# Patient Record
Sex: FEMALE | Race: WHITE | Employment: UNEMPLOYED | URBAN - METROPOLITAN AREA
[De-identification: names, ages, dates, MRNs, and addresses within clinical notes are randomized per-mention and may not be internally consistent; named-entity substitution may affect disease eponyms.]

---

## 2023-12-07 SDOH — HEALTH STABILITY: PHYSICAL HEALTH: ON AVERAGE, HOW MANY MINUTES DO YOU ENGAGE IN EXERCISE AT THIS LEVEL?: 20 MIN

## 2023-12-07 SDOH — ECONOMIC STABILITY: INCOME INSECURITY: HOW HARD IS IT FOR YOU TO PAY FOR THE VERY BASICS LIKE FOOD, HOUSING, MEDICAL CARE, AND HEATING?: NOT VERY HARD

## 2023-12-07 SDOH — ECONOMIC STABILITY: TRANSPORTATION INSECURITY
IN THE PAST 12 MONTHS, HAS LACK OF TRANSPORTATION KEPT YOU FROM MEETINGS, WORK, OR FROM GETTING THINGS NEEDED FOR DAILY LIVING?: NO

## 2023-12-07 SDOH — ECONOMIC STABILITY: FOOD INSECURITY: WITHIN THE PAST 12 MONTHS, THE FOOD YOU BOUGHT JUST DIDN'T LAST AND YOU DIDN'T HAVE MONEY TO GET MORE.: NEVER TRUE

## 2023-12-07 SDOH — ECONOMIC STABILITY: HOUSING INSECURITY
IN THE LAST 12 MONTHS, WAS THERE A TIME WHEN YOU DID NOT HAVE A STEADY PLACE TO SLEEP OR SLEPT IN A SHELTER (INCLUDING NOW)?: NO

## 2023-12-07 SDOH — ECONOMIC STABILITY: TRANSPORTATION INSECURITY
IN THE PAST 12 MONTHS, HAS THE LACK OF TRANSPORTATION KEPT YOU FROM MEDICAL APPOINTMENTS OR FROM GETTING MEDICATIONS?: NO

## 2023-12-07 SDOH — ECONOMIC STABILITY: INCOME INSECURITY: IN THE LAST 12 MONTHS, WAS THERE A TIME WHEN YOU WERE NOT ABLE TO PAY THE MORTGAGE OR RENT ON TIME?: NO

## 2023-12-07 SDOH — ECONOMIC STABILITY: FOOD INSECURITY: WITHIN THE PAST 12 MONTHS, YOU WORRIED THAT YOUR FOOD WOULD RUN OUT BEFORE YOU GOT MONEY TO BUY MORE.: NEVER TRUE

## 2023-12-07 SDOH — HEALTH STABILITY: PHYSICAL HEALTH: ON AVERAGE, HOW MANY DAYS PER WEEK DO YOU ENGAGE IN MODERATE TO STRENUOUS EXERCISE (LIKE A BRISK WALK)?: 2 DAYS

## 2023-12-07 SDOH — ECONOMIC STABILITY: HOUSING INSECURITY: IN THE LAST 12 MONTHS, HOW MANY PLACES HAVE YOU LIVED?: 1

## 2023-12-07 SDOH — ECONOMIC STABILITY: TRANSPORTATION INSECURITY
IN THE PAST 12 MONTHS, HAS LACK OF RELIABLE TRANSPORTATION KEPT YOU FROM MEDICAL APPOINTMENTS, MEETINGS, WORK OR FROM GETTING THINGS NEEDED FOR DAILY LIVING?: NO

## 2023-12-07 SDOH — HEALTH STABILITY: MENTAL HEALTH
STRESS IS WHEN SOMEONE FEELS TENSE, NERVOUS, ANXIOUS, OR CAN'T SLEEP AT NIGHT BECAUSE THEIR MIND IS TROUBLED. HOW STRESSED ARE YOU?: RATHER MUCH

## 2023-12-07 ASSESSMENT — LIFESTYLE VARIABLES
HOW OFTEN DO YOU HAVE SIX OR MORE DRINKS ON ONE OCCASION: NEVER
HOW MANY STANDARD DRINKS CONTAINING ALCOHOL DO YOU HAVE ON A TYPICAL DAY: 1 OR 2
HOW OFTEN DO YOU HAVE A DRINK CONTAINING ALCOHOL: MONTHLY OR LESS
HOW OFTEN DO YOU HAVE A DRINK CONTAINING ALCOHOL: MONTHLY OR LESS
AUDIT-C TOTAL SCORE: 1
HOW MANY STANDARD DRINKS CONTAINING ALCOHOL DO YOU HAVE ON A TYPICAL DAY: 1 OR 2
SKIP TO QUESTIONS 9-10: 1
SKIP TO QUESTIONS 9-10: 1
HOW OFTEN DO YOU HAVE SIX OR MORE DRINKS ON ONE OCCASION: NEVER
AUDIT-C TOTAL SCORE: 1

## 2023-12-07 ASSESSMENT — SOCIAL DETERMINANTS OF HEALTH (SDOH)
IN A TYPICAL WEEK, HOW MANY TIMES DO YOU TALK ON THE PHONE WITH FAMILY, FRIENDS, OR NEIGHBORS?: MORE THAN THREE TIMES A WEEK
ARE YOU MARRIED, WIDOWED, DIVORCED, SEPARATED, NEVER MARRIED, OR LIVING WITH A PARTNER?: NEVER MARRIED
HOW OFTEN DO YOU GET TOGETHER WITH FRIENDS OR RELATIVES?: ONCE A WEEK
ARE YOU MARRIED, WIDOWED, DIVORCED, SEPARATED, NEVER MARRIED, OR LIVING WITH A PARTNER?: NEVER MARRIED
HOW OFTEN DO YOU ATTENT MEETINGS OF THE CLUB OR ORGANIZATION YOU BELONG TO?: NEVER
HOW OFTEN DO YOU HAVE SIX OR MORE DRINKS ON ONE OCCASION: NEVER
HOW OFTEN DO YOU HAVE A DRINK CONTAINING ALCOHOL: MONTHLY OR LESS
ARE YOU MARRIED, WIDOWED, DIVORCED, SEPARATED, NEVER MARRIED, OR LIVING WITH A PARTNER?: NEVER MARRIED
WITHIN THE PAST 12 MONTHS, YOU WORRIED THAT YOUR FOOD WOULD RUN OUT BEFORE YOU GOT THE MONEY TO BUY MORE: NEVER TRUE
DO YOU BELONG TO ANY CLUBS OR ORGANIZATIONS SUCH AS CHURCH GROUPS UNIONS, FRATERNAL OR ATHLETIC GROUPS, OR SCHOOL GROUPS?: NO
HOW HARD IS IT FOR YOU TO PAY FOR THE VERY BASICS LIKE FOOD, HOUSING, MEDICAL CARE, AND HEATING?: NOT VERY HARD
HOW OFTEN DO YOU GET TOGETHER WITH FRIENDS OR RELATIVES?: ONCE A WEEK
HOW OFTEN DO YOU ATTEND CHURCH OR RELIGIOUS SERVICES?: NEVER
DO YOU BELONG TO ANY CLUBS OR ORGANIZATIONS SUCH AS CHURCH GROUPS UNIONS, FRATERNAL OR ATHLETIC GROUPS, OR SCHOOL GROUPS?: NO
IN A TYPICAL WEEK, HOW MANY TIMES DO YOU TALK ON THE PHONE WITH FAMILY, FRIENDS, OR NEIGHBORS?: MORE THAN THREE TIMES A WEEK
HOW OFTEN DO YOU ATTEND CHURCH OR RELIGIOUS SERVICES?: NEVER
HOW OFTEN DO YOU ATTEND CHURCH OR RELIGIOUS SERVICES?: NEVER
HOW MANY DRINKS CONTAINING ALCOHOL DO YOU HAVE ON A TYPICAL DAY WHEN YOU ARE DRINKING: 1 OR 2
DO YOU BELONG TO ANY CLUBS OR ORGANIZATIONS SUCH AS CHURCH GROUPS UNIONS, FRATERNAL OR ATHLETIC GROUPS, OR SCHOOL GROUPS?: NO
HOW OFTEN DO YOU GET TOGETHER WITH FRIENDS OR RELATIVES?: ONCE A WEEK
HOW OFTEN DO YOU ATTENT MEETINGS OF THE CLUB OR ORGANIZATION YOU BELONG TO?: NEVER
HOW OFTEN DO YOU ATTENT MEETINGS OF THE CLUB OR ORGANIZATION YOU BELONG TO?: NEVER
IN A TYPICAL WEEK, HOW MANY TIMES DO YOU TALK ON THE PHONE WITH FAMILY, FRIENDS, OR NEIGHBORS?: MORE THAN THREE TIMES A WEEK

## 2023-12-08 ENCOUNTER — APPOINTMENT (OUTPATIENT)
Dept: MEDICAL GROUP | Facility: IMAGING CENTER | Age: 63
End: 2023-12-08
Payer: MEDICARE

## 2023-12-08 SDOH — HEALTH STABILITY: PHYSICAL HEALTH: ON AVERAGE, HOW MANY DAYS PER WEEK DO YOU ENGAGE IN MODERATE TO STRENUOUS EXERCISE (LIKE A BRISK WALK)?: 2 DAYS

## 2023-12-08 SDOH — HEALTH STABILITY: PHYSICAL HEALTH: ON AVERAGE, HOW MANY MINUTES DO YOU ENGAGE IN EXERCISE AT THIS LEVEL?: 20 MIN

## 2023-12-08 SDOH — ECONOMIC STABILITY: HOUSING INSECURITY: IN THE LAST 12 MONTHS, HOW MANY PLACES HAVE YOU LIVED?: 1

## 2023-12-08 ASSESSMENT — SOCIAL DETERMINANTS OF HEALTH (SDOH)
HOW OFTEN DO YOU HAVE A DRINK CONTAINING ALCOHOL: MONTHLY OR LESS
HOW OFTEN DO YOU ATTEND CHURCH OR RELIGIOUS SERVICES?: NEVER
DO YOU BELONG TO ANY CLUBS OR ORGANIZATIONS SUCH AS CHURCH GROUPS UNIONS, FRATERNAL OR ATHLETIC GROUPS, OR SCHOOL GROUPS?: NO
IN A TYPICAL WEEK, HOW MANY TIMES DO YOU TALK ON THE PHONE WITH FAMILY, FRIENDS, OR NEIGHBORS?: MORE THAN THREE TIMES A WEEK
HOW HARD IS IT FOR YOU TO PAY FOR THE VERY BASICS LIKE FOOD, HOUSING, MEDICAL CARE, AND HEATING?: NOT VERY HARD
HOW OFTEN DO YOU GET TOGETHER WITH FRIENDS OR RELATIVES?: ONCE A WEEK
HOW OFTEN DO YOU HAVE SIX OR MORE DRINKS ON ONE OCCASION: NEVER
HOW OFTEN DO YOU ATTENT MEETINGS OF THE CLUB OR ORGANIZATION YOU BELONG TO?: NEVER
ARE YOU MARRIED, WIDOWED, DIVORCED, SEPARATED, NEVER MARRIED, OR LIVING WITH A PARTNER?: NEVER MARRIED
HOW MANY DRINKS CONTAINING ALCOHOL DO YOU HAVE ON A TYPICAL DAY WHEN YOU ARE DRINKING: 1 OR 2
WITHIN THE PAST 12 MONTHS, YOU WORRIED THAT YOUR FOOD WOULD RUN OUT BEFORE YOU GOT THE MONEY TO BUY MORE: NEVER TRUE

## 2023-12-12 ENCOUNTER — OFFICE VISIT (OUTPATIENT)
Dept: MEDICAL GROUP | Facility: IMAGING CENTER | Age: 63
End: 2023-12-12
Payer: MEDICARE

## 2023-12-12 VITALS
WEIGHT: 218.7 LBS | HEART RATE: 78 BPM | HEIGHT: 63 IN | SYSTOLIC BLOOD PRESSURE: 128 MMHG | BODY MASS INDEX: 38.75 KG/M2 | RESPIRATION RATE: 16 BRPM | OXYGEN SATURATION: 98 % | DIASTOLIC BLOOD PRESSURE: 76 MMHG | TEMPERATURE: 97.8 F

## 2023-12-12 DIAGNOSIS — Z51.81 ENCOUNTER FOR MEDICATION MONITORING: ICD-10-CM

## 2023-12-12 DIAGNOSIS — Z13.6 SCREENING FOR CARDIOVASCULAR CONDITION: ICD-10-CM

## 2023-12-12 DIAGNOSIS — Z11.4 SCREENING FOR HIV (HUMAN IMMUNODEFICIENCY VIRUS): ICD-10-CM

## 2023-12-12 DIAGNOSIS — Z91.89 AT RISK FOR SLEEP APNEA: ICD-10-CM

## 2023-12-12 DIAGNOSIS — J01.00 ACUTE NON-RECURRENT MAXILLARY SINUSITIS: ICD-10-CM

## 2023-12-12 DIAGNOSIS — Z11.59 NEED FOR HEPATITIS C SCREENING TEST: ICD-10-CM

## 2023-12-12 DIAGNOSIS — G25.2 FINE TREMOR: ICD-10-CM

## 2023-12-12 DIAGNOSIS — G89.29 CHRONIC RIGHT HIP PAIN: ICD-10-CM

## 2023-12-12 DIAGNOSIS — F33.0 MILD EPISODE OF RECURRENT MAJOR DEPRESSIVE DISORDER (HCC): ICD-10-CM

## 2023-12-12 DIAGNOSIS — Z12.31 ENCOUNTER FOR SCREENING MAMMOGRAM FOR BREAST CANCER: ICD-10-CM

## 2023-12-12 DIAGNOSIS — S52.501D CLOSED FRACTURE OF DISTAL END OF RIGHT RADIUS WITH ROUTINE HEALING, UNSPECIFIED FRACTURE MORPHOLOGY, SUBSEQUENT ENCOUNTER: ICD-10-CM

## 2023-12-12 DIAGNOSIS — Z01.419 ENCOUNTER FOR WELL WOMAN EXAM WITH ROUTINE GYNECOLOGICAL EXAM: ICD-10-CM

## 2023-12-12 DIAGNOSIS — M25.551 CHRONIC RIGHT HIP PAIN: ICD-10-CM

## 2023-12-12 DIAGNOSIS — R45.86 MOOD SWINGS: ICD-10-CM

## 2023-12-12 PROBLEM — F32.A DEPRESSION: Status: ACTIVE | Noted: 2023-12-12

## 2023-12-12 PROCEDURE — 99204 OFFICE O/P NEW MOD 45 MIN: CPT | Performed by: INTERNAL MEDICINE

## 2023-12-12 PROCEDURE — 3074F SYST BP LT 130 MM HG: CPT | Performed by: INTERNAL MEDICINE

## 2023-12-12 PROCEDURE — 3078F DIAST BP <80 MM HG: CPT | Performed by: INTERNAL MEDICINE

## 2023-12-12 PROCEDURE — 1126F AMNT PAIN NOTED NONE PRSNT: CPT | Performed by: INTERNAL MEDICINE

## 2023-12-12 RX ORDER — BUSPIRONE HYDROCHLORIDE 5 MG/1
5 TABLET ORAL DAILY
COMMUNITY
Start: 2023-11-13 | End: 2024-03-06 | Stop reason: SDUPTHER

## 2023-12-12 RX ORDER — DIVALPROEX SODIUM 250 MG
250 TABLET, EXTENDED RELEASE 24 HR ORAL DAILY
COMMUNITY
Start: 2021-12-13 | End: 2023-12-12

## 2023-12-12 RX ORDER — PROPRANOLOL HYDROCHLORIDE 10 MG/1
10-20 TABLET ORAL 2 TIMES DAILY
COMMUNITY
Start: 2021-12-03 | End: 2024-02-23

## 2023-12-12 RX ORDER — AMOXICILLIN AND CLAVULANATE POTASSIUM 875; 125 MG/1; MG/1
1 TABLET, FILM COATED ORAL 2 TIMES DAILY
Qty: 10 TABLET | Refills: 0 | Status: SHIPPED | OUTPATIENT
Start: 2023-12-12 | End: 2023-12-17

## 2023-12-12 RX ORDER — SERTRALINE HCL 25 MG
25 TABLET ORAL DAILY
COMMUNITY
Start: 2023-12-07 | End: 2024-02-26

## 2023-12-12 RX ORDER — DIVALPROEX SODIUM 250 MG
1500 TABLET, EXTENDED RELEASE 24 HR ORAL DAILY
Qty: 30 TABLET | COMMUNITY
Start: 2023-12-12

## 2023-12-12 RX ORDER — OMEGA-3 FATTY ACIDS/FISH OIL 300-1000MG
200 CAPSULE ORAL PRN
COMMUNITY
Start: 2021-12-13 | End: 2023-12-28

## 2023-12-12 ASSESSMENT — PAIN SCALES - GENERAL: PAINLEVEL: NO PAIN

## 2023-12-12 ASSESSMENT — PATIENT HEALTH QUESTIONNAIRE - PHQ9: CLINICAL INTERPRETATION OF PHQ2 SCORE: 0

## 2023-12-12 NOTE — LETTER
[facility]  Pcp Pt States None  No address on file  Fax: [phone]   Authorization for Release/Disclosure of   Protected Health Information   Name: SUZAN GARLAND : 1960 SSN: [id number]   Address: [facility] [place] [address] Phone:    There are no phone numbers on file.   I authorize the entity listed below to release/disclose the PHI below to:   [facility]/Pcp Pt States None and Abhinav Jones M.D.   Provider or Entity Name:     Address   City, State, [facility]   Phone:      Fax:     Reason for request: continuity of care   Information to be released:    [  ] LAST COLONOSCOPY,  including any PATH REPORT and follow-up  [  ] LAST FIT/COLOGUARD RESULT [  ] LAST DEXA  [  ] LAST MAMMOGRAM  [  ] LAST PAP  [  ] LAST LABS [  ] RETINA EXAM REPORT  [  ] IMMUNIZATION RECORDS  [  ] Release all info      [  ] Check here and initial the line next to each item to release ALL health information INCLUDING  _____ Care and treatment for drug and / or alcohol abuse  _____ HIV testing, infection status, or AIDS  _____ Genetic Testing    DATES OF SERVICE OR TIME PERIOD TO BE DISCLOSED: _____________  I understand and acknowledge that:  * This Authorization may be revoked at any time by you in writing, except if your health information has already been used or disclosed.  * Your health information that will be used or disclosed as a result of you signing this authorization could be re-disclosed by the recipient. If this occurs, your re-disclosed health information may no longer be protected by State or Federal laws.  * You may refuse to sign this Authorization. Your refusal will not affect your ability to obtain treatment.  * This Authorization becomes effective upon signing and will  on (date) __________.      If no date is indicated, this Authorization will  one (1) year from the signature date.    Name: Suzan Garland  Signature: Date:   2023     PLEASE FAX REQUESTED RECORDS BACK TO: [phone]  [phone]

## 2023-12-12 NOTE — ASSESSMENT & PLAN NOTE
7/7/2023 right wrist x ray: stable alignment of the distal radius fractures. No complicating features.    She had GLF and had right radius fractures in [place] in May 2023. She did occupational therapy.         Referral orthopedics

## 2023-12-12 NOTE — ASSESSMENT & PLAN NOTE
Patient reported she gets fine tremors taking Depakote, so she was put on propranolol to control the fine tremors  On propranolol 10-20 mg BID prnOn propranolol 10-20 mg BID prn

## 2023-12-12 NOTE — ASSESSMENT & PLAN NOTE
Snoring loud, daytime fatigue, non-refreshing sleep, wake up a few night   Body mass index is 38.74 kg/m².

## 2023-12-13 NOTE — ASSESSMENT & PLAN NOTE
Sinus congestion, runny nose, cough, yellow sputum since 12/5/2023    Denies fever, chest pain, SOB  Taking DayQuil and night Quil, steamy shower, drinking water, honey tea, lemon  She tested herself at home, Covid-19 negative

## 2023-12-13 NOTE — ASSESSMENT & PLAN NOTE
On depakote ER 1500 mg daily per her previous psychiatrist for bipolar like symptoms.   She would like to see psychiatry

## 2023-12-26 ENCOUNTER — PHYSICAL THERAPY (OUTPATIENT)
Dept: PHYSICAL THERAPY | Facility: REHABILITATION | Age: 63
End: 2023-12-26
Attending: INTERNAL MEDICINE
Payer: MEDICARE

## 2023-12-26 DIAGNOSIS — M25.539 PAIN IN WRIST, UNSPECIFIED LATERALITY: ICD-10-CM

## 2023-12-26 DIAGNOSIS — M79.643 PAIN OF HAND, UNSPECIFIED LATERALITY: ICD-10-CM

## 2023-12-26 DIAGNOSIS — S52.501D CLOSED FRACTURE OF DISTAL END OF RIGHT RADIUS WITH ROUTINE HEALING, UNSPECIFIED FRACTURE MORPHOLOGY, SUBSEQUENT ENCOUNTER: ICD-10-CM

## 2023-12-26 PROCEDURE — 97162 PT EVAL MOD COMPLEX 30 MIN: CPT

## 2023-12-26 ASSESSMENT — ENCOUNTER SYMPTOMS
PAIN SCALE AT HIGHEST: 5
PAIN SCALE: 5
PAIN SCALE AT LOWEST: 3

## 2023-12-26 NOTE — OP THERAPY EVALUATION
Outpatient Physical Therapy  INITIAL EVALUATION    [facility] [address].  [address]  [place] [address]  Phone:  [phone]  Fax:  [phone]    Date of Evaluation: 2023    Patient: Iveth Daley  YOB: 1960  MRN: [id number]     Referring Provider: Abhinav Jones M.D.  [facility] [place] [address]   Referring Diagnosis Closed fracture of distal end of right radius with routine healing, unspecified fracture morphology, subsequent encounter [S52.501D];Chronic right hip pain [M25.551, G89.29]     Time Calculation                 Chief Complaint: No chief complaint on file.    Visit Diagnoses     ICD-10-CM   1. Closed fracture of distal end of right radius with routine healing, unspecified fracture morphology, subsequent encounter  S52.501D       Date of onset of impairment: No data found    Subjective:   History of Present Illness:     Mechanism of injury:  Pt. is a 63 Y.O.F who reports a FOOSH while chasing after her nephew resulting in R wrist fracture in May. She was casted and went home the next day ( she was in [place] visiting relatives and lives in [place].  She was placed n a new cast x 6 weeks then after that she did occupation therapy and worked on ROM, gripping and dexterity exercises. She relocated to [place] and is establishing care here and wants to continue with rehabilitation for the R limb.  PMHx: significant for carpal tunnel, tendon release >20 years; R shoulder surgery- debridment     Pain:     Current pain ratin    At best pain rating:  3    At worst pain ratin    Pain Comments::  PAIN  Location: 1. R wrist anterolat., refers to: FA -lateral aspect  2. 'flexor trigger'  Descriptors: 1. Stiff, a.m.  2.   Aggravated with arm extension as with cleaning Wt. bearing, groceries, kitchen tasks -peel  settles with hand exercises, lasting after settles.  Eases w/: rock tape, ice, Tylenol  Progression: better, but exacerbation  d/t recent move  Hand dominance:  Right  Patient Goals:     Patient goals for therapy:  Decreased pain and increased strength      Past Medical History:   Diagnosis Date    Anxiety     Depression     Diverticulitis of ascending colon 2021    abscess in the colon    Wrist fracture 05/10/2023    Right wrist broken from a fall     Past Surgical History:   Procedure Laterality Date    COLON RESECTION       Social History     Tobacco Use    Smoking status: Never     Passive exposure: Yes    Smokeless tobacco: Never   Substance Use Topics    Alcohol use: Yes     Alcohol/week: 0.6 oz     Types: 1 Glasses of wine per week     Comment: On occasion wine with dinner     Family and Occupational History     Socioeconomic History    Marital status: Single     Spouse name: Not on file    Number of children: Not on file    Years of education: Not on file    Highest education level: Associate degree: academic program   Occupational History    Not on file       Objective     Active Range of Motion     Right Shoulder   Flexion: 170 degrees     Left Elbow   Normal active range of motion    Right Elbow   Extension: -5 degrees   Flexion: WFL    Right Wrist   Wrist flexion: 45 degrees   Wrist extension: 75 degrees     Strength:      Left Shoulder   Normal muscle strength    Right Shoulder   Planes of Motion   Flexion: 4   Extension: 5   Abduction: 4-   Adduction: 5   External rotation at 0°: 4-   Internal rotation at 0°: 5   Isolated Muscles   Biceps: 5   Triceps: 4+     Additional Strength Details  Dynamometer ( TI elbow F)   L: 35#   R: 15#      Exercises/Treatment  Time-based treatments/modalities:           Assessment, Response and Plan:   Assessment details:  Pt. Is a 63 y.o. F who presents with above impairments and S: moderate I: mild N: R radial Fx S: chronic. Pt. Is appropriate for skilled PT intervention. Pt. was educated in anatomical, pathoanatomical considerations of PT diagosis and treatment options to address it.   Pt. Also educated in compliance with PT visits/HEP to improve functional limitations and reach goals.     Barriers to therapy:  Age  Prognosis: good    Goals:   Short Term Goals:   Pt will teach back a HEP decreasing pain, improving ROM and strength    Inc. Elbow arom 0-135    Short term goal time span:  2-4 weeks      Long Term Goals:    Inc. Gross Strength of shoulder by 1 mmt grade to 4+/5/5   Pt able to sit> stand w/UE support w/out difficulty/pain   Able to lift, push, pull 10# for ability to open car, front door, lift groceries    Long term goal time span:  4-6 weeks    Plan:   Therapy options:  Physical therapy treatment to continue  Planned therapy interventions:  Self Care ADL Training (CPT 48794), E Stim Unattended (CPT 79138), Ultrasound (CPT 99006), Manual Therapy (CPT 58126), Neuromuscular Re-education (CPT 49881) and Therapeutic Exercise (CPT 78167)  Frequency:  2x week  Duration in visits:  10  Discussed with:  Patient  Plan details:  R hip evaluation  Recommend OT evaluation      Functional Assessment Used        Referring provider co-signature:  I have reviewed this plan of care and my co-signature certifies the need for services.    Certification Period: 12/26/2023 to  02/08/24    Physician Signature: ________________________________ Date: ______________

## 2023-12-26 NOTE — PROGRESS NOTES
Pain in wrist, unspecified laterality  -     Referral to Occupational Therapy    Pain of hand, unspecified laterality  -     Referral to Occupational Therapy

## 2023-12-28 ENCOUNTER — OFFICE VISIT (OUTPATIENT)
Dept: MEDICAL GROUP | Facility: IMAGING CENTER | Age: 63
End: 2023-12-28
Payer: MEDICARE

## 2023-12-28 ENCOUNTER — PHYSICAL THERAPY (OUTPATIENT)
Dept: PHYSICAL THERAPY | Facility: REHABILITATION | Age: 63
End: 2023-12-28
Attending: INTERNAL MEDICINE
Payer: MEDICARE

## 2023-12-28 VITALS
RESPIRATION RATE: 16 BRPM | BODY MASS INDEX: 38.02 KG/M2 | HEART RATE: 66 BPM | HEIGHT: 63 IN | TEMPERATURE: 98.3 F | OXYGEN SATURATION: 96 % | WEIGHT: 214.6 LBS | SYSTOLIC BLOOD PRESSURE: 130 MMHG | DIASTOLIC BLOOD PRESSURE: 80 MMHG

## 2023-12-28 DIAGNOSIS — R14.0 ABDOMINAL BLOATING: ICD-10-CM

## 2023-12-28 DIAGNOSIS — S52.521D CLOSED TORUS FRACTURE OF DISTAL END OF RIGHT RADIUS WITH ROUTINE HEALING, SUBSEQUENT ENCOUNTER: ICD-10-CM

## 2023-12-28 PROBLEM — K31.9 STOMACH PROBLEMS: Status: ACTIVE | Noted: 2023-12-28

## 2023-12-28 LAB
APPEARANCE UR: CLEAR
BILIRUB UR STRIP-MCNC: NEGATIVE MG/DL
COLOR UR AUTO: YELLOW
GLUCOSE UR STRIP.AUTO-MCNC: NEGATIVE MG/DL
KETONES UR STRIP.AUTO-MCNC: NEGATIVE MG/DL
LEUKOCYTE ESTERASE UR QL STRIP.AUTO: NEGATIVE
NITRITE UR QL STRIP.AUTO: NEGATIVE
PH UR STRIP.AUTO: 7.5 [PH] (ref 5–8)
PROT UR QL STRIP: NEGATIVE MG/DL
RBC UR QL AUTO: NEGATIVE
SP GR UR STRIP.AUTO: 1.01
UROBILINOGEN UR STRIP-MCNC: 0.2 MG/DL

## 2023-12-28 PROCEDURE — 3075F SYST BP GE 130 - 139MM HG: CPT | Performed by: PHYSICIAN ASSISTANT

## 2023-12-28 PROCEDURE — 99213 OFFICE O/P EST LOW 20 MIN: CPT | Performed by: PHYSICIAN ASSISTANT

## 2023-12-28 PROCEDURE — 97110 THERAPEUTIC EXERCISES: CPT

## 2023-12-28 PROCEDURE — 97535 SELF CARE MNGMENT TRAINING: CPT

## 2023-12-28 PROCEDURE — 81002 URINALYSIS NONAUTO W/O SCOPE: CPT | Performed by: PHYSICIAN ASSISTANT

## 2023-12-28 PROCEDURE — 3079F DIAST BP 80-89 MM HG: CPT | Performed by: PHYSICIAN ASSISTANT

## 2023-12-28 PROCEDURE — 1126F AMNT PAIN NOTED NONE PRSNT: CPT | Performed by: PHYSICIAN ASSISTANT

## 2023-12-28 RX ORDER — FAMOTIDINE 20 MG/1
20 TABLET, FILM COATED ORAL 2 TIMES DAILY
Qty: 10 TABLET | Refills: 0 | Status: SHIPPED | OUTPATIENT
Start: 2023-12-28 | End: 2024-01-04 | Stop reason: SDUPTHER

## 2023-12-28 RX ORDER — OMEPRAZOLE 40 MG/1
40 CAPSULE, DELAYED RELEASE ORAL DAILY
Qty: 30 CAPSULE | Refills: 0 | Status: SHIPPED | OUTPATIENT
Start: 2023-12-28 | End: 2024-01-22

## 2023-12-28 ASSESSMENT — PAIN SCALES - GENERAL: PAINLEVEL: NO PAIN

## 2023-12-28 NOTE — OP THERAPY DAILY TREATMENT
"Outpatient Physical Therapy  DAILY TREATMENT     [facility] [address].  [address]  [place] [address]  Phone:  [phone]  Fax:  [phone]    Date: 12/28/2023    Patient: Iveth Daley  YOB: 1960  MRN: [id number]     Time Calculation    Start time: 1030  Stop time: 1115 Time Calculation (min): 45 minutes         Chief Complaint: No chief complaint on file.    Visit #: 2    SUBJECTIVE:  'same.' I am doing wrist stretches. I used to do all these exercises in OT  Location: 1. R wrist anterolat., refers to: FA -lateral aspect  2. 'flexor trigger'  Descriptors: 1. Stiff, a.m.  2.   Aggravated with arm extension as with cleaning Wt. bearing, groceries, kitchen tasks -peel  settles with hand exercises    OBJECTIVE:  Current objective measures:   Isolated Muscles   Biceps: 5   Triceps: 4+ Right Elbow   WEExtension: -5 degrees   Flexion: WFL    Right Wrist   Wrist flexion: 45 degrees   Wrist extension: 75 degrees   Flexion: 4   Extension: 5   Abduction: 4-   Adduction: 5   External rotation at 0°: 4-   Internal rotation at 0°: 5           Therapeutic Exercises (CPT 16328):     1. FA: WE, Supination, 10 PITB    2. Elbow extension, 10 PiTB; QS    3. FTT wall/scapula retract, 10x5\"    4. supine R SH F, 10 2#    5. S/L R SH ER, 10-12 1#    6. S/L R SH ABD, 10 2#    7. sit>stand w/RUE wb, x5    8. wall PU      Therapeutic Exercise Summary: GERIATRIC CONDITIONING  [RFYN]    ELASTIC BAND WRIST EXTENSION  -  Repeat 10 Times, Hold 1 Second(s), Complete 1 Set, Perform 1 Times a Day    ELASTIC BAND WRIST SUPINATION -  Repeat 12 Times, Hold 1 Second(s), Complete 1 Set, Perform 1 Times a Week    GERIATRIC - ELASTIC BAND TRICEPS EXTENSION -  Repeat 1 Time, Hold 1 Second(s), Complete 1 Set, Perform 1 Times a Day    Partial Sit to Stand for Unilateral UE Weight Bearing -  Repeat 5 Times, Perform 1 Times a Day      Time-based treatments/modalities:           Pain rating (1-10) before " treatment:  5  Pain rating (1-10) after treatment:  3    ASSESSMENT:   Response to treatment: toleates the PREs well. Returns demo of wrist ex to carryout w/hout.  p/w N: R radial Fx S: chronic    PLAN/RECOMMENDATIONS: Pt. To have an OT consult  Plan for treatment: therapy treatment to continue next visit.  Planned interventions for next visit: continue with current treatment.

## 2023-12-29 ENCOUNTER — HOSPITAL ENCOUNTER (OUTPATIENT)
Dept: RADIOLOGY | Facility: MEDICAL CENTER | Age: 63
End: 2023-12-29
Attending: PHYSICIAN ASSISTANT
Payer: MEDICARE

## 2023-12-29 DIAGNOSIS — R14.0 ABDOMINAL BLOATING: ICD-10-CM

## 2023-12-29 PROCEDURE — 74018 RADEX ABDOMEN 1 VIEW: CPT

## 2023-12-29 NOTE — PROGRESS NOTES
Subjective:     CC:   Chief Complaint   Patient presents with    GI Problem     Pt states she has been feeling gassy, bloating, Hx of diverticulitis. Tried Gas-x, miralax. Some loose stools     Other     She was amoxicillin for sinus infection and doesn't know is this correlates.        HPI:   Iveth presents today to discuss:    Stomach problems  Pt admits to upper abdominal pressure and bloating x 2 weeks. Has had increased gas. Has been following a clear liquid diet with some mild relief. Gasx and miralax have helped mildly. Had been eating fattier foods lately due to the holidays.  Takes daily ibuprofen for arthritis.  Has a history of cholecystectomy. No N/V/Gerd. Was recently on amoxicillin for a sinus infection - still having post nasal drip with green phlegm. Has a history of diverticulitis with partial colectomy (2021).  No melena or hematochezia.      Past Medical History:   Diagnosis Date    Anxiety     Depression     Diverticulitis of ascending colon 2021    abscess in the colon    Wrist fracture 05/10/2023    Right wrist broken from a fall     Family History   Problem Relation Age of Onset    Stroke Mother     Parkinson's Disease Father     Cancer Sister         MM    No Known Problems Brother     Ovarian Cancer Neg Hx     Tubal Cancer Neg Hx     Peritoneal Cancer Neg Hx     Colorectal Cancer Neg Hx     Breast Cancer Neg Hx     Bilateral Breast Cancer Neg Hx     BRCA 1 Neg Hx     BRCA 1 Carrier  Neg Hx     BRCA 2 Neg Hx     BRCA 2 Carrier Neg Hx      Past Surgical History:   Procedure Laterality Date    CHOLECYSTECTOMY      2021    COLON RESECTION       Social History     Tobacco Use    Smoking status: Never     Passive exposure: Yes    Smokeless tobacco: Never   Vaping Use    Vaping Use: Some days    Substances: THC   Substance Use Topics    Alcohol use: Yes     Alcohol/week: 0.6 oz     Types: 1 Glasses of wine per week     Comment: On occasion wine with dinner    Drug use: Not Currently      "Frequency: 2.0 times per week     Types: Marijuana     Social History     Social History Narrative    Not on file     Current Outpatient Medications Ordered in Epic   Medication Sig Dispense Refill    omeprazole (PRILOSEC) 40 MG delayed-release capsule Take 1 Capsule by mouth every day. 30 Capsule 0    famotidine (PEPCID) 20 MG Tab Take 1 Tablet by mouth 2 times a day. 10 Tablet 0    busPIRone (BUSPAR) 5 MG tablet Take 5 mg by mouth every day.      propranolol (INDERAL) 10 MG Tab Take 10-20 mg by mouth 2 times a day.      ZOLOFT 25 MG tablet Take 25 mg by mouth every day. Half a tab in the morning      DEPAKOTE  MG TABLET SR 24 HR Take 6 Tablets by mouth every day. 30 Tablet      No current Epic-ordered facility-administered medications on file.     Codeine and Sulfa drugs    PMH/PSH/FH/Social history reviewed.  Vaccinations discussed.  Previous records and labs reviewed. Discussed age appropriate anticipatory guidance.    ROS: see hpi  Gen: no fevers/chills  Pulm: no sob, no cough  CV: no chest pain, no palpitations, no edema  GI: no nausea/vomiting, no diarrhea  Skin: no rash    Objective:   Exam:  /80 (BP Location: Left arm, Patient Position: Sitting, BP Cuff Size: Adult)   Pulse 66   Temp 36.8 °C (98.3 °F) (Temporal)   Resp 16   Ht 1.6 m (5' 3\")   Wt 97.3 kg (214 lb 9.6 oz)   SpO2 96%   BMI 38.01 kg/m²    Body mass index is 38.01 kg/m².    Gen: Alert and oriented, No apparent distress.  HEENT: Head atraumatic, normocephalic. Pupils equal and round.  Lungs: Normal effort, CTA bilaterally, no wheezes, rhonchi, or rales  CV: Regular rate and rhythm. No murmurs, rubs, or gallops.  ABD: +BS. Non-tender, non-distended. No rebound, rigidity, or guarding.  Ext: No clubbing, cyanosis, edema.    Assessment & Plan:     63 y.o. female with the following -     1. Abdominal bloating  Exam benign.  Suspect gastritis from chronic NSAID use.  Will start omeprazole and Pepcid.  After 5 days can stop Pepcid and " maintain omeprazole.  GERD diet.  May advance to soft foods and then solid foods as tolerated.  Stop NSAID.  May switch to Tylenol for pain.  Abdominal x-ray to assess bowel gas pattern.  UA negative.  Complete labs as ordered by PCP.  Follow-up in 1 week for recheck.  ER if symptoms worsen or if you develop fever, abdominal pain, diarrhea, melena, nausea/vomiting.  - POCT Urinalysis  - omeprazole (PRILOSEC) 40 MG delayed-release capsule; Take 1 Capsule by mouth every day.  Dispense: 30 Capsule; Refill: 0  - famotidine (PEPCID) 20 MG Tab; Take 1 Tablet by mouth 2 times a day.  Dispense: 10 Tablet; Refill: 0  - [id number]-0 VIEW; Future    Return in about 1 week (around 1/4/2024) for Follow-up labs/tests.    Val Hong PA-C ([place])  Physician Assistant Certified  [facility]    Please note that this dictation was created using voice recognition software. I have made every reasonable attempt to correct obvious errors, but I expect that there are errors of grammar and possibly content that I did not discover before finalizing the note.

## 2023-12-29 NOTE — ASSESSMENT & PLAN NOTE
Pt admits to upper abdominal pressure and bloating x 2 weeks. Has had increased gas. Has been following a clear liquid diet with some mild relief. Gasx and miralax have helped mildly. Had been eating fattier foods lately due to the holidays.  Takes daily ibuprofen for arthritis.  Has a history of cholecystectomy. No N/V/Gerd. Was recently on amoxicillin for a sinus infection - still having post nasal drip with green phlegm. Has a history of diverticulitis with partial colectomy (2021).  No melena or hematochezia.

## 2023-12-29 NOTE — PATIENT INSTRUCTIONS
It was a pleasure meeting with you today at [facility]!    Your medical history/records and medications were reviewed today.     UPDATE on MyChart Results: If you have blood work, and/or imaging studies, or any other test or procedure completed, you will have access to results as soon as they become available in MyChart. Recently, these results will be available for review at the same time that your provider is able to see results!    This will likely mean you will see a result before your provider has had a chance to review and discuss with you.  Some results or care notes may be hard to understand and may be serious in nature.    We look at every result and your provider will contact you to explain what they mean and discuss appropriate next steps. Please allow for at least 72 business hours for chart and result review.     We prefer that you wait for your care team to contact you with your results.  Often, your provider will discuss your results with you at your next appointment. We look forward to continuing to partner with you in your care.    Please review my practice information below:    If you have any prescription refill requests, please send them via [employer] or discuss with your provider at the start of your office visits. Please allow 3-5 business days for lab and testing review and you will be contacted via [employer] with those results, or if advised to make a follow up appointment regarding those results, then please do so.     Once resulted, your lab/test/imaging results will show up automatically in your MyChart. Please wait for my interpretation and recommendations prior to viewing your results to avoid any unnecessary confusion or misinterpretation. I will address all of the lab values that I interpret as abnormal and message you accordingly on your MyChart. I will always send you a message about your results even if they are normal. If you do not hear back from me within 5-7 business  days after completing your tests, then please send me a message on BRAINDIGIT so I can obtain your results (especially if you went to an outside lab or imaging center - [facility], [facility], etc).     If you have any additional questions or concerns beyond my interpretation of your results, please make an appointment with me to discuss in further detail.    Please only use the BRAIND[employer]IT messaging system for questions regarding your most recent appointment or if advised to use otherwise (glucose or blood pressure reporting).     If you have any new problems or concerns, you must make an appointment to discuss. This includes any referral requests, lab requests (unless advised to notify me for pre-appt labs), medication side effects, or request for medication adjustments.     Please arrive 15 minutes prior to your appointment time to complete your check-in and intake with the medical assistant.      Thank you,    Val Hong PA-C ([place])  Physician Assistant Certified  [facility]    -----------------------------------------------------------------    Attn: Patients of [facility]:    In an effort to continue to provide excellent and efficient care to our patients, it is vital that we continue to use our resources appropriately. With that, this is a reminder that B[employer]IT is used for prescription refill requests, test results, virtual visits, and chart review only.     Any new questions, concerns/conditions, lab/imaging requests, medication adjustments, new prescriptions, or referral requests do require an appointment (virtually or in person), unless discussed otherwise at your most recent appointment.     Thank you for your understanding,    [facility]

## 2024-01-03 ENCOUNTER — APPOINTMENT (OUTPATIENT)
Dept: PHYSICAL THERAPY | Facility: REHABILITATION | Age: 64
End: 2024-01-03
Attending: INTERNAL MEDICINE
Payer: MEDICARE

## 2024-01-04 ENCOUNTER — OFFICE VISIT (OUTPATIENT)
Dept: MEDICAL GROUP | Facility: IMAGING CENTER | Age: 64
End: 2024-01-04
Payer: MEDICARE

## 2024-01-04 VITALS
BODY MASS INDEX: 37.56 KG/M2 | TEMPERATURE: 97.2 F | WEIGHT: 212 LBS | OXYGEN SATURATION: 95 % | HEART RATE: 75 BPM | SYSTOLIC BLOOD PRESSURE: 128 MMHG | HEIGHT: 63 IN | DIASTOLIC BLOOD PRESSURE: 82 MMHG

## 2024-01-04 DIAGNOSIS — Z23 NEED FOR VACCINATION: ICD-10-CM

## 2024-01-04 DIAGNOSIS — R14.0 ABDOMINAL BLOATING: ICD-10-CM

## 2024-01-04 PROCEDURE — 3079F DIAST BP 80-89 MM HG: CPT | Performed by: PHYSICIAN ASSISTANT

## 2024-01-04 PROCEDURE — G0009 ADMIN PNEUMOCOCCAL VACCINE: HCPCS | Performed by: PHYSICIAN ASSISTANT

## 2024-01-04 PROCEDURE — 90677 PCV20 VACCINE IM: CPT | Performed by: PHYSICIAN ASSISTANT

## 2024-01-04 PROCEDURE — 3074F SYST BP LT 130 MM HG: CPT | Performed by: PHYSICIAN ASSISTANT

## 2024-01-04 PROCEDURE — 99213 OFFICE O/P EST LOW 20 MIN: CPT | Mod: 25 | Performed by: PHYSICIAN ASSISTANT

## 2024-01-04 RX ORDER — FAMOTIDINE 20 MG/1
20 TABLET, FILM COATED ORAL 2 TIMES DAILY
Qty: 60 TABLET | Refills: 0 | Status: SHIPPED | OUTPATIENT
Start: 2024-01-04 | End: 2024-01-16 | Stop reason: SDUPTHER

## 2024-01-04 RX ORDER — POLYETHYLENE GLYCOL 3350 17 G/17G
17 POWDER, FOR SOLUTION ORAL DAILY
COMMUNITY

## 2024-01-04 ASSESSMENT — PATIENT HEALTH QUESTIONNAIRE - PHQ9: CLINICAL INTERPRETATION OF PHQ2 SCORE: 0

## 2024-01-04 NOTE — PROGRESS NOTES
Subjective:     CC:   Chief Complaint   Patient presents with    Lab Results     Imaging        HPI:   Iveth presents today to discuss:    Abdominal bloating  Patient notes some improvement in her abdominal bloating and gas with the addition of omeprazole and Pepcid.  She completed a 5-day course of Pepcid.  She wishes to continue that medication, as she noticed the most improvement while taking it.  She has been trying to modify her diet to focus more on whole foods.  She drinks water and tea throughout the day.  Limited carbonated beverages.  Recently started on meloxicam for chronic hip pain.  No nausea, vomiting, diarrhea, constipation.  No abdominal pain.  Last colonoscopy within 5 years.  Has not established with a GI in [place] yet.      Past Medical History:   Diagnosis Date    Anxiety     Depression     Diverticulitis of ascending colon 2021    abscess in the colon    Wrist fracture 05/10/2023    Right wrist broken from a fall     Family History   Problem Relation Age of Onset    Stroke Mother     Parkinson's Disease Father     Cancer Sister         MM    No Known Problems Brother     Ovarian Cancer Neg Hx     Tubal Cancer Neg Hx     Peritoneal Cancer Neg Hx     Colorectal Cancer Neg Hx     Breast Cancer Neg Hx     Bilateral Breast Cancer Neg Hx     BRCA 1 Neg Hx     BRCA 1 Carrier  Neg Hx     BRCA 2 Neg Hx     BRCA 2 Carrier Neg Hx      Past Surgical History:   Procedure Laterality Date    CHOLECYSTECTOMY      2021    COLON RESECTION       Social History     Tobacco Use    Smoking status: Never     Passive exposure: Yes    Smokeless tobacco: Never   Vaping Use    Vaping Use: Some days    Substances: THC   Substance Use Topics    Alcohol use: Yes     Alcohol/week: 0.6 oz     Types: 1 Glasses of wine per week     Comment: On occasion wine with dinner    Drug use: Not Currently     Frequency: 2.0 times per week     Types: Marijuana     Social History     Social History Narrative    Not on file     Current  "Outpatient Medications Ordered in Epic   Medication Sig Dispense Refill    polyethylene glycol/lytes (MIRALAX) Pack Take 17 g by mouth every day.      Wheat Dextrin (BENEFIBER PO) Take  by mouth.      famotidine (PEPCID) 20 MG Tab Take 1 Tablet by mouth 2 times a day for 30 days. 60 Tablet 0    acetaminophen (TYLENOL) 325 MG Tab 325 mg.      meloxicam (MOBIC) 7.5 MG Tab Take 1 Tablet by mouth every day. 30 Tablet 1    omeprazole (PRILOSEC) 40 MG delayed-release capsule Take 1 Capsule by mouth every day. 30 Capsule 0    busPIRone (BUSPAR) 5 MG tablet Take 5 mg by mouth every day.      propranolol (INDERAL) 10 MG Tab Take 10-20 mg by mouth 2 times a day.      ZOLOFT 25 MG tablet Take 25 mg by mouth every day. Half a tab in the morning      DEPAKOTE  MG TABLET SR 24 HR Take 6 Tablets by mouth every day. 30 Tablet      No current Epic-ordered facility-administered medications on file.     Codeine and Sulfa drugs    PMH/PSH/FH/Social history reviewed.  Vaccinations discussed.  Previous records and labs reviewed. Discussed age appropriate anticipatory guidance.    ROS: see hpi  Gen: no fevers/chills  Pulm: no sob, no cough  CV: no chest pain, no palpitations, no edema  GI: no nausea/vomiting, no diarrhea  Skin: no rash    Objective:   Exam:  /82 (BP Location: Right arm, Patient Position: Sitting, BP Cuff Size: Adult long)   Pulse 75   Temp 36.2 °C (97.2 °F) (Temporal)   Ht 1.6 m (5' 3\")   Wt 96.2 kg (212 lb)   SpO2 95%   BMI 37.55 kg/m²    Body mass index is 37.55 kg/m².    Gen: Alert and oriented, No apparent distress.  HEENT: Head atraumatic, normocephalic. Pupils equal and round.  Lungs: Normal effort, CTA bilaterally, no wheezes, rhonchi, or rales  CV: Regular rate and rhythm. No murmurs, rubs, or gallops.  ABD: +BS. Non-tender, non-distended. No rebound, rigidity, or guarding.  Ext: No clubbing, cyanosis, edema.    Assessment & Plan:     63 y.o. female with the following -     1. Abdominal " bloating  Exam benign.  Will resume famotidine with omeprazole.  Reassess in 1 month.  Add MiraLAX and Benefiber daily for regularity.  Establish with GI.  Further testing/imaging per their (or PCP) discretion.  - Referral to Gastroenterology  - famotidine (PEPCID) 20 MG Tab; Take 1 Tablet by mouth 2 times a day for 30 days.  Dispense: 60 Tablet; Refill: 0    2. Need for vaccination  - Pneumococcal Conjugate Vaccine 20-Valent (6 wks+)    Return in about 1 month (around 2/4/2024) for Follow-up, Medication recheck with Dr. Jones.    Val Hong PA-C ([place])  Physician Assistant Certified  [facility]    Please note that this dictation was created using voice recognition software. I have made every reasonable attempt to correct obvious errors, but I expect that there are errors of grammar and possibly content that I did not discover before finalizing the note.

## 2024-01-04 NOTE — ASSESSMENT & PLAN NOTE
Patient notes some improvement in her abdominal bloating and gas with the addition of omeprazole and Pepcid.  She completed a 5-day course of Pepcid.  She wishes to continue that medication, as she noticed the most improvement while taking it.  She has been trying to modify her diet to focus more on whole foods.  She drinks water and tea throughout the day.  Limited carbonated beverages.  Recently started on meloxicam for chronic hip pain.  No nausea, vomiting, diarrhea, constipation.  No abdominal pain.  Last colonoscopy within 5 years.  Has not established with a GI in [place] yet.

## 2024-01-04 NOTE — PATIENT INSTRUCTIONS
It was a pleasure meeting with you today at [facility]!    Your medical history/records and medications were reviewed today.     UPDATE on MyChart Results: If you have blood work, and/or imaging studies, or any other test or procedure completed, you will have access to results as soon as they become available in MyChart. Recently, these results will be available for review at the same time that your provider is able to see results!    This will likely mean you will see a result before your provider has had a chance to review and discuss with you.  Some results or care notes may be hard to understand and may be serious in nature.    We look at every result and your provider will contact you to explain what they mean and discuss appropriate next steps. Please allow for at least 72 business hours for chart and result review.     We prefer that you wait for your care team to contact you with your results.  Often, your provider will discuss your results with you at your next appointment. We look forward to continuing to partner with you in your care.    Please review my practice information below:    If you have any prescription refill requests, please send them via [employer] or discuss with your provider at the start of your office visits. Please allow 3-5 business days for lab and testing review and you will be contacted via [employer] with those results, or if advised to make a follow up appointment regarding those results, then please do so.     Once resulted, your lab/test/imaging results will show up automatically in your MyChart. Please wait for my interpretation and recommendations prior to viewing your results to avoid any unnecessary confusion or misinterpretation. I will address all of the lab values that I interpret as abnormal and message you accordingly on your MyChart. I will always send you a message about your results even if they are normal. If you do not hear back from me within 5-7 business  days after completing your tests, then please send me a message on [employer] so I can obtain your results (especially if you went to an outside lab or imaging center - [facility], [facility], etc).     If you have any additional questions or concerns beyond my interpretation of your results, please make an appointment with me to discuss in further detail.    Please only use the [employer]File messaging system for questions regarding your most recent appointment or if advised to use otherwise (glucose or blood pressure reporting).     If you have any new problems or concerns, you must make an appointment to discuss. This includes any referral requests, lab requests (unless advised to notify me for pre-appt labs), medication side effects, or request for medication adjustments.     Please arrive 15 minutes prior to your appointment time to complete your check-in and intake with the medical assistant.      Thank you,    Val Hong PA-C ([place])  Physician Assistant Certified  [facility]    -----------------------------------------------------------------    Attn: Patients of [facility]:    In an effort to continue to provide excellent and efficient care to our patients, it is vital that we continue to use our resources appropriately. With that, this is a reminder that [employer]File is used for prescription refill requests, test results, virtual visits, and chart review only.     Any new questions, concerns/conditions, lab/imaging requests, medication adjustments, new prescriptions, or referral requests do require an appointment (virtually or in person), unless discussed otherwise at your most recent appointment.     Thank you for your understanding,    [facility]

## 2024-01-16 ENCOUNTER — OFFICE VISIT (OUTPATIENT)
Dept: MEDICAL GROUP | Facility: IMAGING CENTER | Age: 64
End: 2024-01-16
Payer: MEDICARE

## 2024-01-16 ENCOUNTER — HOSPITAL ENCOUNTER (OUTPATIENT)
Dept: RADIOLOGY | Facility: MEDICAL CENTER | Age: 64
End: 2024-01-16
Attending: INTERNAL MEDICINE
Payer: MEDICARE

## 2024-01-16 VITALS
HEIGHT: 63 IN | SYSTOLIC BLOOD PRESSURE: 120 MMHG | DIASTOLIC BLOOD PRESSURE: 70 MMHG | HEART RATE: 81 BPM | OXYGEN SATURATION: 98 % | RESPIRATION RATE: 14 BRPM | BODY MASS INDEX: 37.95 KG/M2 | WEIGHT: 214.2 LBS | TEMPERATURE: 97.8 F

## 2024-01-16 DIAGNOSIS — K21.9 GASTROESOPHAGEAL REFLUX DISEASE WITHOUT ESOPHAGITIS: ICD-10-CM

## 2024-01-16 DIAGNOSIS — Z12.31 ENCOUNTER FOR SCREENING MAMMOGRAM FOR BREAST CANCER: ICD-10-CM

## 2024-01-16 DIAGNOSIS — R14.0 ABDOMINAL BLOATING: ICD-10-CM

## 2024-01-16 PROCEDURE — 3074F SYST BP LT 130 MM HG: CPT | Performed by: INTERNAL MEDICINE

## 2024-01-16 PROCEDURE — 3078F DIAST BP <80 MM HG: CPT | Performed by: INTERNAL MEDICINE

## 2024-01-16 PROCEDURE — 99213 OFFICE O/P EST LOW 20 MIN: CPT | Performed by: INTERNAL MEDICINE

## 2024-01-16 PROCEDURE — 1126F AMNT PAIN NOTED NONE PRSNT: CPT | Performed by: INTERNAL MEDICINE

## 2024-01-16 PROCEDURE — 77067 SCR MAMMO BI INCL CAD: CPT

## 2024-01-16 RX ORDER — FAMOTIDINE 20 MG/1
20 TABLET, FILM COATED ORAL 2 TIMES DAILY
Qty: 60 TABLET | Refills: 2 | Status: SHIPPED | OUTPATIENT
Start: 2024-01-16 | End: 2024-02-15

## 2024-01-16 ASSESSMENT — PAIN SCALES - GENERAL: PAINLEVEL: NO PAIN

## 2024-01-16 NOTE — PROGRESS NOTES
"Established Patient    Iveth Daley is a 63 y.o. female who presents today with the following:    CC:   Chief Complaint   Patient presents with    Medication Management     Pt states acid reflux medication has been helping.     Follow-Up     Pt states she did see the [facility] for her hip issue and was referred to pain clinic     Paperwork     Disability paperwork        HPI:     Her abdominal bloating is improving with PPI and famotidine, benefiber, and miralax as needed.  She is going to see pain management for her right hip pain    Problem   Gastroesophageal Reflux Disease Without Esophagitis        Current Outpatient Medications   Medication Sig    famotidine (PEPCID) 20 MG Tab Take 1 Tablet by mouth 2 times a day for 30 days.    polyethylene glycol/lytes (MIRALAX) Pack Take 17 g by mouth every day.    Wheat Dextrin (BENEFIBER PO) Take  by mouth.    acetaminophen (TYLENOL) 325 MG Tab 325 mg.    meloxicam (MOBIC) 7.5 MG Tab Take 1 Tablet by mouth every day.    omeprazole (PRILOSEC) 40 MG delayed-release capsule Take 1 Capsule by mouth every day.    busPIRone (BUSPAR) 5 MG tablet Take 5 mg by mouth every day.    propranolol (INDERAL) 10 MG Tab Take 10-20 mg by mouth 2 times a day.    ZOLOFT 25 MG tablet Take 25 mg by mouth every day. Half a tab in the morning    DEPAKOTE  MG TABLET SR 24 HR Take 6 Tablets by mouth every day.     Allergies   Allergen Reactions    Codeine Shortness of Breath    Sulfa Drugs Rash       Allergies, past medical history, past surgical history, medications, family history, social history reviewed and updated.    ROS Please see HPI    Physical Exam  Vitals: /70 (BP Location: Left arm, Patient Position: Sitting, BP Cuff Size: Adult)   Pulse 81   Temp 36.6 °C (97.8 °F) (Temporal)   Resp 14   Ht 1.6 m (5' 3\")   Wt 97.2 kg (214 lb 3.2 oz)   SpO2 98%   BMI 37.94 kg/m²   Physical Exam  Constitutional:       Appearance: Normal appearance.   HENT:      Head: Normocephalic and " atraumatic.      Right Ear: External ear normal.      Left Ear: External ear normal.      Nose: Nose normal.      Mouth/Throat:      Pharynx: Oropharynx is clear.   Cardiovascular:      Rate and Rhythm: Normal rate and regular rhythm.      Pulses: Normal pulses.   Pulmonary:      Effort: Pulmonary effort is normal.      Breath sounds: Normal breath sounds.   Abdominal:      General: Bowel sounds are normal.      Palpations: Abdomen is soft.      Tenderness: There is no abdominal tenderness.   Musculoskeletal:      Cervical back: Neck supple.      Right lower leg: No edema.      Left lower leg: No edema.   Skin:     General: Skin is warm and dry.   Neurological:      Mental Status: She is alert. Mental status is at baseline.   Psychiatric:         Mood and Affect: Mood normal.         Behavior: Behavior normal.         Thought Content: Thought content normal.         Judgment: Judgment normal.           Assessment and Plan    1. Abdominal bloating  - famotidine (PEPCID) 20 MG Tab; Take 1 Tablet by mouth 2 times a day for 30 days.  Dispense: 60 Tablet; Refill: 2  Trial of DC PPI after total of one month    2. Gastroesophageal reflux disease without esophagitis  - famotidine (PEPCID) 20 MG Tab; Take 1 Tablet by mouth 2 times a day for 30 days.  Dispense: 60 Tablet; Refill: 2  Trial of DC PPI after total of one month    She is bringing her disability paper work next visit      Follow-up:Return if symptoms worsen or fail to improve.    This note was created using voice recognition software. There may be unintended errors in spelling, grammar or content.

## 2024-01-17 ENCOUNTER — HOSPITAL ENCOUNTER (OUTPATIENT)
Dept: RADIOLOGY | Facility: MEDICAL CENTER | Age: 64
End: 2024-01-17
Payer: MEDICARE

## 2024-01-20 DIAGNOSIS — K21.9 GASTROESOPHAGEAL REFLUX DISEASE, UNSPECIFIED WHETHER ESOPHAGITIS PRESENT: ICD-10-CM

## 2024-01-20 DIAGNOSIS — R14.0 ABDOMINAL BLOATING: ICD-10-CM

## 2024-01-22 RX ORDER — OMEPRAZOLE 40 MG/1
40 CAPSULE, DELAYED RELEASE ORAL DAILY
Qty: 90 CAPSULE | Refills: 0 | Status: SHIPPED | OUTPATIENT
Start: 2024-01-22 | End: 2024-03-22

## 2024-01-22 NOTE — TELEPHONE ENCOUNTER
Received request via: Pharmacy    Was the patient seen in the last year in this department? Yes    Does the patient have an active prescription (recently filled or refills available) for medication(s) requested? No    Pharmacy Name:    [id number] - [place], [place] - [address]     Does the patient have FPC Plus and need 100 day supply (blood pressure, diabetes and cholesterol meds only)? Patient does not have SCP     REQUEST FOR 90 DAYS PRESCRIPTION. DX Code Needed.

## 2024-01-26 ENCOUNTER — OFFICE VISIT (OUTPATIENT)
Dept: MEDICAL GROUP | Facility: IMAGING CENTER | Age: 64
End: 2024-01-26
Payer: MEDICARE

## 2024-01-26 VITALS
RESPIRATION RATE: 14 BRPM | OXYGEN SATURATION: 97 % | HEART RATE: 66 BPM | SYSTOLIC BLOOD PRESSURE: 124 MMHG | DIASTOLIC BLOOD PRESSURE: 88 MMHG | TEMPERATURE: 97.9 F | WEIGHT: 217 LBS | BODY MASS INDEX: 38.45 KG/M2 | HEIGHT: 63 IN

## 2024-01-26 DIAGNOSIS — S52.501P CLOSED FRACTURE OF DISTAL END OF RIGHT RADIUS WITH MALUNION, UNSPECIFIED FRACTURE MORPHOLOGY, SUBSEQUENT ENCOUNTER: ICD-10-CM

## 2024-01-26 DIAGNOSIS — Z02.9 ADMINISTRATIVE ENCOUNTER: ICD-10-CM

## 2024-01-26 PROCEDURE — 3074F SYST BP LT 130 MM HG: CPT | Performed by: INTERNAL MEDICINE

## 2024-01-26 PROCEDURE — 3079F DIAST BP 80-89 MM HG: CPT | Performed by: INTERNAL MEDICINE

## 2024-01-26 PROCEDURE — 99214 OFFICE O/P EST MOD 30 MIN: CPT | Performed by: INTERNAL MEDICINE

## 2024-01-27 NOTE — ASSESSMENT & PLAN NOTE
7/7/2023 right wrist x ray: stable alignment of the distal radius fractures. No complicating features.    She had GLF and had right radius fractures in [place] in May 2023. She has been doing occupational therapy.   She still has persistent wrist pain and wrist weakness.        She is doing OT and following with orthopedics

## 2024-01-27 NOTE — PROGRESS NOTES
"Established Patient    Iveth Daley is a 63 y.o. female who presents today with the following:    CC:   Chief Complaint   Patient presents with    Follow-Up     For the disability paperwork       HPI:       Problem   Administrative Encounter    Paperwork filled out and scanned.     Closed Fracture of Distal End of Right Radius With Malunion    7/7/2023 right wrist x ray: stable alignment of the distal radius fractures. No complicating features.    She had GLF and had right radius fractures in [place] in May 2023. She has been doing occupational therapy.   She still has persistent wrist pain and wrist weakness.        She is doing OT and following with orthopedics          Current Outpatient Medications   Medication Sig    omeprazole (PRILOSEC) 40 MG delayed-release capsule TAKE 1 CAPSULE BY MOUTH EVERY DAY    famotidine (PEPCID) 20 MG Tab Take 1 Tablet by mouth 2 times a day for 30 days.    polyethylene glycol/lytes (MIRALAX) Pack Take 17 g by mouth every day.    Wheat Dextrin (BENEFIBER PO) Take  by mouth.    acetaminophen (TYLENOL) 325 MG Tab 325 mg.    meloxicam (MOBIC) 7.5 MG Tab Take 1 Tablet by mouth every day.    busPIRone (BUSPAR) 5 MG tablet Take 5 mg by mouth every day.    propranolol (INDERAL) 10 MG Tab Take 10-20 mg by mouth 2 times a day.    ZOLOFT 25 MG tablet Take 25 mg by mouth every day. Half a tab in the morning    DEPAKOTE  MG TABLET SR 24 HR Take 6 Tablets by mouth every day.     Allergies   Allergen Reactions    Codeine Shortness of Breath    Sulfa Drugs Rash       Allergies, past medical history, past surgical history, medications, family history, social history reviewed and updated.    ROS Please see HPI    Physical Exam  Vitals: /88 (BP Location: Left arm, Patient Position: Sitting, BP Cuff Size: Adult)   Pulse 66   Temp 36.6 °C (97.9 °F) (Temporal)   Resp 14   Ht 1.6 m (5' 3\")   Wt 98.4 kg (217 lb)   SpO2 97%   BMI 38.44 kg/m²   Physical Exam  Constitutional:     "   Appearance: Normal appearance.   HENT:      Head: Normocephalic and atraumatic.      Right Ear: External ear normal.      Left Ear: External ear normal.      Nose: Nose normal.      Mouth/Throat:      Pharynx: Oropharynx is clear.   Cardiovascular:      Rate and Rhythm: Normal rate and regular rhythm.      Pulses: Normal pulses.   Pulmonary:      Effort: Pulmonary effort is normal.      Breath sounds: Normal breath sounds.   Abdominal:      General: Bowel sounds are normal.      Palpations: Abdomen is soft.      Tenderness: There is no abdominal tenderness.   Musculoskeletal:      Cervical back: Neck supple.      Right lower leg: No edema.      Left lower leg: No edema.      Comments: Right wrist tenderness, decreased wrist ROM, wrist weakness   Skin:     General: Skin is warm and dry.   Neurological:      Mental Status: She is alert. Mental status is at baseline.   Psychiatric:         Mood and Affect: Mood normal.         Behavior: Behavior normal.         Thought Content: Thought content normal.         Judgment: Judgment normal.           Assessment and Plan    1. Closed fracture of distal end of right radius with malunion, unspecified fracture morphology, subsequent encounter  Continue Occupational therapy  Continue follow with orthopedics    2. Administrative encounter  Paperwork filled out and scanned.        Follow-up:Return in about 2 weeks (around 2/9/2024), or if symptoms worsen or fail to improve.    This note was created using voice recognition software. There may be unintended errors in spelling, grammar or content.

## 2024-01-30 ENCOUNTER — OCCUPATIONAL THERAPY (OUTPATIENT)
Dept: OCCUPATIONAL THERAPY | Facility: REHABILITATION | Age: 64
End: 2024-01-30
Attending: INTERNAL MEDICINE
Payer: MEDICARE

## 2024-01-30 DIAGNOSIS — M25.539 PAIN IN THE WRIST, UNSPECIFIED LATERALITY: ICD-10-CM

## 2024-01-30 DIAGNOSIS — M79.643 PAIN OF HAND, UNSPECIFIED LATERALITY: ICD-10-CM

## 2024-01-30 PROCEDURE — 97165 OT EVAL LOW COMPLEX 30 MIN: CPT

## 2024-01-30 ASSESSMENT — ENCOUNTER SYMPTOMS
QUALITY: ACHING
PAIN SCALE AT LOWEST: 4
PAIN SCALE: 5
PAIN LOCATION: R WRIST
PAIN SCALE AT HIGHEST: 7
QUALITY: THROBBING

## 2024-01-30 NOTE — OP THERAPY EVALUATION
Outpatient Occupational Therapy  HAND THERAPY INITIAL EVALUATION    [facility] Occupational Therapy [address]  [address]  [place] [address]  Phone:  [phone]  Fax:  [phone]    Date of Evaluation: 2024    Patient: Iveth Daley  YOB: 1960  MRN: [id number]     Referring Provider: Abhinav [place] [address]   Referring Diagnosis Pain in wrist, unspecified laterality [M25.539];Pain of hand, unspecified laterality [M79.643]     Time Calculation    Start time: 1300  Stop time: 1345 Time Calculation (min): 45 minutes             Chief Complaint: Arm Problem    Visit Diagnoses     ICD-10-CM   1. Pain in the wrist, unspecified laterality  M25.539   2. Pain of hand, unspecified laterality  M79.643       Subjective:   History of Present Illness:     Mechanism of injury:  Per chart review patient had a mechanical fall back in May 2023 and sustained a distal radius fracture.  She was living in [place] at the time but has moved here since.  She was treated with nonoperative measures.  She does have some discomfort in the wrist still.  Denies numbness or tingling.  Does have a history of carpal tunnel release as well as a trigger finger release on the right ring finger.       Sleep disturbance: wakes up with pain, throbbing.  Pain:     Current pain ratin    At best pain ratin    At worst pain ratin    Location:  R wrist    Quality:  Aching and throbbing    Pain Comments::  Pain increases with hand use  Social Support:     Lives with:  Alone  Hand dominance:  Right  Treatments:     Previous treatment:  Immobilization, medication and occupational therapy    Current treatment:  Medication    Treatment Comments:  Pt completed OT in [place] after initial injury, moved to [place] in November of last year  Activities of Daily Living:     Patient reported ADL status: Patient is independent with ADLs and IADLs however  reports pain with all hand based activity, particularly with IADLs, cites making bed, changing the sheets, doing the dishes as painful activities. She has to space out this activity day to day to complete necessary tasks. She recently moved to the area and reports the moving requirements have been exacerbating her pain.   Patient Goals:     Patient goals for therapy:  Decreased pain and increased motion      Past Medical History:   Diagnosis Date    Anxiety     Depression     Diverticulitis of ascending colon 2021    abscess in the colon    Wrist fracture 05/10/2023    Right wrist broken from a fall     Past Surgical History:   Procedure Laterality Date    CHOLECYSTECTOMY      2021    COLON RESECTION       Social History     Tobacco Use    Smoking status: Never     Passive exposure: Yes    Smokeless tobacco: Never   Substance Use Topics    Alcohol use: Yes     Alcohol/week: 0.6 oz     Types: 1 Glasses of wine per week     Comment: On occasion wine with dinner     Family and Occupational History     Socioeconomic History    Marital status: Single     Spouse name: Not on file    Number of children: Not on file    Years of education: Not on file    Highest education level: Associate degree: academic program   Occupational History    Not on file       Objective     Active Range of Motion     Right Wrist   Wrist flexion: with pain  Wrist extension: with pain  Radial deviation: with pain  Ulnar deviation: with pain    Right Thumb   Opposition: Tendency toward mcp collapse with opposition    Additional Active Range of Motion Details  Pain on ulnar side of wrist with ext/flex, pain on radial side of wrist with radial/ulnar deviation    Strength     Left Wrist/Hand      (2nd hand position)     Trial 1: 37    Trial 2: 35    Trial 3: 39    Average: 37    Right Wrist/Hand      (2nd hand position)     Trial 1: 32    Trial 2: 35    Trial 3: 30    Average: 32.33        Therapeutic Exercises (CPT 05477):     1. digit  flexion/extension, x20 reps, digit spacers for decreased neural tension    2. adductor release via chip clip, 2:00 min, R hand    3. ball roll on plate for proprioceptive feedback, 3:00 min, R hand      Time-based treatments/modalities:  Occupational Therapy Timed Treatment Charges  Therapeutic exercise minutes (CPT 87797): 15 minutes      Assessment and Plan:   Problem list/assessment: decreased strength and pain    Assessment details:  Patient presents to OT with complaints of wrist and hand pain secondary to injury may of last year, this is impacting her function as she has pain with all hand based activity. She has been doing some exercises from her OT in [place], advised to continue these and added 3 new ones. Will continue to monitor and progress as tolerated.   Barriers to therapy:  None    Goals:   Short Term Goals: decrease pain, increase soft tissue/skin mobility, increase strength and independent with HEP performance  Short term goal timespan:  2-4 weeks  Patient progress towards short term goals:  HEP initiated    Long Term Goals:   Patient will improve score on UEFI from 38/80 to 60/80 to indicate improving hand function  Patient will have pain free AROM of R wrist  Patient will report no pain with making the bed   Long term goal timespan:  6-8 weeks    Plan:   Occupational/Hand Therapy options:  Occupational therapy treatment to continue  Planned therapy interventions:  AROM, A/AROM, PROM, passive stretch, graded resistive tasks to increase strength, pain management, manual therapy (CPT 16541), home exercise training and adaptive training to increase functional performance  Planned modality interventions: paraffin treatment (CPT 59680) and moist hot pack (CPT 73012)  Prognosis: good    Frequency:  1x week  Duration in weeks:  8  Duration in visits:  8  Discussed with:  Patient  Patient/caregiver understanding of therapy treatment and goals: Good understanding of therapy treatment and goals  Plan  details:  Progress pain free function of RUE      Functional Assessment Used    UEFI 38/80    Referring provider co-signature:  I have reviewed this plan of care and my co-signature certifies the need for services.    Certification Period: 01/30/2024 to  04/29/24    Physician Signature: ________________________________ Date: ______________

## 2024-01-31 ENCOUNTER — PHYSICAL THERAPY (OUTPATIENT)
Dept: PHYSICAL THERAPY | Facility: REHABILITATION | Age: 64
End: 2024-01-31
Attending: INTERNAL MEDICINE
Payer: MEDICARE

## 2024-01-31 DIAGNOSIS — G89.29 CHRONIC BILATERAL LOW BACK PAIN WITH RIGHT-SIDED SCIATICA: ICD-10-CM

## 2024-01-31 DIAGNOSIS — M54.41 CHRONIC BILATERAL LOW BACK PAIN WITH RIGHT-SIDED SCIATICA: ICD-10-CM

## 2024-01-31 PROCEDURE — 97110 THERAPEUTIC EXERCISES: CPT

## 2024-01-31 PROCEDURE — 97112 NEUROMUSCULAR REEDUCATION: CPT

## 2024-01-31 NOTE — OP THERAPY PROGRESS SUMMARY
Outpatient Physical Therapy  PROGRESS SUMMARY NOTE      [facility] Physical Therapy [place] [address] [facility].  [address]  [place] [address]  Phone:  [phone]  Fax:  [phone]    Date of Visit: 01/31/2024    Patient: Iveth Daley  YOB: 1960  MRN: [id number]     Referring Provider: Abhinav [place] [address]   Referring Diagnosis Unspecified fracture of the lower end of right radius, subsequent encounter for closed fracture with routine healing [S52.501D];Pain in right hip [M25.551];Other chronic pain [G89.29]     Visit Diagnoses     ICD-10-CM   1. Chronic bilateral low back pain with right-sided sciatica  M54.41    G89.29       Rehab Potential: good    Progress Report Period: 12/-1/31/204    Functional Assessment Used          Objective Findings and Assessment:   Patient progression towards goals: Pt reporting LBP started in 2000,  and since 2001, radiates to R glut lateral thigh to knee. Constant pain:   Aggrs: prolonged sitting, driving,    Relief: Tylenol and ice, Aspercreme patches and icy hot.    CLOF: walking 20min flat, will radiate if walking longer than 20min  [facility], Feb, may get an ablation.   Described: Aching, throbbing, radiating, numbness/tingling-hip and knee, occasional N/T will go below knee   Hx of PT in [place] started in July 2023, & had pain/neurologist in [place].  Reports MRI: L4-5 facet arthropathy, scoliosis.   Enjoys water aerobics & caregiving for grand kids(6mo old and 1.6yo)  Pain current : 5/10  Worst: 7-8/10(overdoing during day and driving too long)  Best: 4/10  Goal increase strength and improve pain to allow pt to CG safely, increase activity levels.    Pt demo +radiculopathy SLR <45 and + crossed SLR, responded + to prone x 5' and improved within 10.  Repeated ext not performed d/t time constraints. Edu on spine, disc and nerve health. Reinforced daily ext in  prone, not sleeping in prone.  Reinforced + for disc protrusion and nerve impingement, reinforced centralization vs peripheralization edu.    Objective findings and assessment details: Lumbar AROM  Flex 75%, hands past knee, pain across B SI JT  Ext to neutral, pain across B SI JT  SB L 25%  SB R 10% + R hip pain  Standing rotation hip L equal bilat  Standing rotation R: equal bilat    MMT: R/L  Hip flex 4-/ 3+, assessed in sitting, otherwise myotomes 4+  Reflexes L4, S1:2/4, equal bilaterally    SLR +L LE approx 35 deg  Crossed SLR +    Notable shuffling/flat foot strike and decr stride length  Hx of recent fall.  Reports hx of balance impairment(didn't go into detail)    Sustained prone x 10', decr R hip pain, s/s, decr numbness and tingling    Goals:   Short Term Goals:   1. Pt will be indep and compliant with HEP  2. Pt will participate in balance assessment:miniBEST  Short term goal time span:  2-4 weeks      Long Term Goals:    1. Pt will be able to demo lumbar AROM WFL to improve SLR >45deg  2. Pt will be able to improve core endurance >2min with core stab ex's to allow standing >1 hr  3. Pt will demo improvement >4on miniBEST to decr risk for falls.  Long term goal time span:  6-8 weeks    Plan:   Planned therapy interventions:  Neuromuscular Re-education (CPT 31910), Mechanical Traction (CPT 76151) and Therapeutic Exercise (CPT 40211)  Other planned therapy interventions:  Needs Owestry LB  Frequency:  1x week  Duration in weeks:  8      Referring provider co-signature:  I have reviewed this plan of care and my co-signature certifies the need for services.     Certification Period: 01/31/2024 to 04/30/24    Physician Signature: ________________________________ Date: ______________

## 2024-01-31 NOTE — OP THERAPY DAILY TREATMENT
"Outpatient Physical Therapy  DAILY TREATMENT     [facility] [address].  [address]  [place] [address]  Phone:  [phone]  Fax:  [phone]    Date: 01/31/2024    Patient: Iveth Daley  YOB: 1960  MRN: [id number]     Time Calculation    Start time: 1415  Stop time: 1500 Time Calculation (min): 45 minutes         Chief Complaint: Back Problem    Visit #: 3    SUBJECTIVE:  Pt presents requesting assessment of R hip.  Reporting address R hand and UE in OT, unsure if both PT and OT will continue.    OBJECTIVE:  Current objective measures:   See PN          Therapeutic Exercises (CPT 35513):     1. FA: WE, Supination, 10 PITB    2. Elbow extension, 10 PiTB; QS    3. FTT wall/scapula retract, 10x5\"    4. supine R SH F, 10 2#    5. S/L R SH ER, 10-12 1#    6. S/L R SH ABD, 10 2#    7. sit>stand w/RUE wb, x5    8. wall PU    10. Prone on elbow and prone lying, x10', every 1-2hrs/waking hrs of day      Therapeutic Exercise Summary: GERIATRIC CONDITIONING  [WFRFYNC]    ELASTIC BAND WRIST EXTENSION  -  Repeat 10 Times, Hold 1 Second(s), Complete 1 Set, Perform 1 Times a Day    ELASTIC BAND WRIST SUPINATION -  Repeat 12 Times, Hold 1 Second(s), Complete 1 Set, Perform 1 Times a Week    GERIATRIC - ELASTIC BAND TRICEPS EXTENSION -  Repeat 1 Time, Hold 1 Second(s), Complete 1 Set, Perform 1 Times a Day    Partial Sit to Stand for Unilateral UE Weight Bearing -  Repeat 5 Times, Perform 1 Times a Day    Time-based treatments/modalities:    Physical Therapy Timed Treatment Charges  Neuromusc re-ed, balance, coor, post minutes (CPT 51721): 30 minutes  Therapeutic exercise minutes (CPT 43462): 15 minutes    ASSESSMENT:   Response to treatment: see PN    PLAN/RECOMMENDATIONS: Pt. To have an OT consult  Plan for treatment: therapy treatment to continue next visit.  Planned interventions for next visit: continue with current treatment.         "

## 2024-02-06 ENCOUNTER — TELEMEDICINE (OUTPATIENT)
Dept: MEDICAL GROUP | Facility: IMAGING CENTER | Age: 64
End: 2024-02-06
Payer: MEDICARE

## 2024-02-06 ENCOUNTER — APPOINTMENT (OUTPATIENT)
Dept: OCCUPATIONAL THERAPY | Facility: REHABILITATION | Age: 64
End: 2024-02-06
Attending: INTERNAL MEDICINE
Payer: MEDICARE

## 2024-02-06 VITALS — TEMPERATURE: 97.5 F | BODY MASS INDEX: 37.92 KG/M2 | HEIGHT: 63 IN | WEIGHT: 214 LBS

## 2024-02-06 DIAGNOSIS — R11.0 NAUSEA: ICD-10-CM

## 2024-02-06 DIAGNOSIS — K59.00 CONSTIPATION, UNSPECIFIED CONSTIPATION TYPE: ICD-10-CM

## 2024-02-06 DIAGNOSIS — S52.501P CLOSED FRACTURE OF DISTAL END OF RIGHT RADIUS WITH MALUNION, UNSPECIFIED FRACTURE MORPHOLOGY, SUBSEQUENT ENCOUNTER: ICD-10-CM

## 2024-02-06 PROCEDURE — 99213 OFFICE O/P EST LOW 20 MIN: CPT | Mod: 95 | Performed by: INTERNAL MEDICINE

## 2024-02-06 NOTE — ASSESSMENT & PLAN NOTE
7/7/2023 right wrist x ray: stable alignment of the distal radius fractures. No complicating features.    She had GLF and had right radius fractures in [place] in May 2023. She has been doing occupational therapy.   She still has persistent wrist pain and wrist weakness.        She is doing OT and following with orthopedics

## 2024-02-06 NOTE — PROGRESS NOTES
Virtual Visit: Established Patient   This visit was conducted via Zoom using secure and encrypted videoconferencing technology.   The patient was in a private location outside of their home in the [facility].    The patient's identity was confirmed and verbal consent was obtained for this virtual visit.     Subjective:   CC:   Chief Complaint   Patient presents with    Medication Follow-up       Iveth Daley is a 63 y.o. female presenting for evaluation and management of:    Nausea after overeat.   Denies heartburn, abdominal pain  Constipation on fiber and miralax as needed    7/7/2023 right wrist x ray: stable alignment of the distal radius fractures. No complicating features.    She had GLF and had right radius fractures in [place] in May 2023. She has been doing occupational therapy.   She still has persistent wrist pain and wrist weakness.        She is doing OT and following with orthopedics    ROS   Per HPI    Current medicines (including changes today)  Current Outpatient Medications   Medication Sig Dispense Refill    omeprazole (PRILOSEC) 40 MG delayed-release capsule TAKE 1 CAPSULE BY MOUTH EVERY DAY 90 Capsule 0    famotidine (PEPCID) 20 MG Tab Take 1 Tablet by mouth 2 times a day for 30 days. 60 Tablet 2    polyethylene glycol/lytes (MIRALAX) Pack Take 17 g by mouth every day.      Wheat Dextrin (BENEFIBER PO) Take  by mouth.      acetaminophen (TYLENOL) 325 MG Tab 325 mg.      busPIRone (BUSPAR) 5 MG tablet Take 5 mg by mouth every day.      propranolol (INDERAL) 10 MG Tab Take 10-20 mg by mouth 2 times a day.      ZOLOFT 25 MG tablet Take 25 mg by mouth every day. Half a tab in the morning      DEPAKOTE  MG TABLET SR 24 HR Take 6 Tablets by mouth every day. 30 Tablet      No current facility-administered medications for this visit.       Patient Active Problem List    Diagnosis Date Noted    Nausea 02/06/2024    Constipation 02/06/2024    Administrative encounter 01/26/2024     "Gastroesophageal reflux disease without esophagitis 01/16/2024    Abdominal bloating 01/04/2024    Stomach problems 12/28/2023    At risk for sleep apnea 12/12/2023    Depression 12/12/2023    Mood swings 12/12/2023    Fine tremor 12/12/2023    Closed fracture of distal end of right radius with malunion 12/12/2023    Chronic right hip pain 12/12/2023    Acute non-recurrent maxillary sinusitis 12/12/2023        Objective:   Temp 36.4 °C (97.5 °F)   Ht 1.6 m (5' 3\")   Wt 97.1 kg (214 lb)   BMI 37.91 kg/m²     Physical Exam:  Constitutional: Alert, no distress, well-groomed.  Skin: No rashes in visible areas.  Eye: Round. Conjunctiva clear, lids normal. No icterus.   ENMT: Lips pink without lesions, good dentition, moist mucous membranes. Phonation normal.  Neck: No masses, no thyromegaly. Moves freely without pain.  Respiratory: Unlabored respiratory effort, no cough or audible wheeze  Psych: Alert and oriented x3, normal affect and mood.     Assessment and Plan:   The following treatment plan was discussed:     1. Nausea  Ginger as needed  Ok to continue her famotidine and omeprazole     2. Constipation, unspecified constipation type  Recommend adequate hydration (>64 oz of water/day), high fiber diet (fiber 35 gram/day), dietary fibers, and miralax prn.     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         "

## 2024-02-09 ENCOUNTER — PHYSICAL THERAPY (OUTPATIENT)
Dept: PHYSICAL THERAPY | Facility: REHABILITATION | Age: 64
End: 2024-02-09
Attending: INTERNAL MEDICINE
Payer: MEDICARE

## 2024-02-09 DIAGNOSIS — G89.29 CHRONIC RIGHT HIP PAIN: ICD-10-CM

## 2024-02-09 DIAGNOSIS — S52.501D CLOSED FRACTURE OF DISTAL END OF RIGHT RADIUS WITH ROUTINE HEALING, SUBSEQUENT ENCOUNTER: ICD-10-CM

## 2024-02-09 DIAGNOSIS — M25.551 CHRONIC RIGHT HIP PAIN: ICD-10-CM

## 2024-02-09 PROCEDURE — 97110 THERAPEUTIC EXERCISES: CPT

## 2024-02-09 PROCEDURE — 97535 SELF CARE MNGMENT TRAINING: CPT

## 2024-02-09 NOTE — OP THERAPY DAILY TREATMENT
"Outpatient Physical Therapy  DAILY TREATMENT     [facility] Physical [address].  [address]  [place] [address]  Phone:  [phone]  Fax:  [phone]    Date: 02/09/2024    Patient: Iveth Daley  YOB: 1960  MRN: [id number]     Time Calculation                   Chief Complaint: No chief complaint on file.    Visit #: 4    SUBJECTIVE:  Pt presents requesting assessment of R hip.  Reporting address R hand and UE in OT, unsure if both PT and OT will continue.    OBJECTIVE:  Current objective measures:   See PN          Therapeutic Exercises (CPT 73952):     1. FA: WE, Supination, 10 PITB    2. Elbow extension, 10 PiTB; QS    3. FTT wall/scapula retract, 10x5\"    4. supine R SH F, 10 2#    5. S/L R SH ER, 10-12 1#    6. S/L R SH ABD, 10 2#    7. sit>stand w/RUE wb, x5    8. wall PU    10. Prone on elbow and prone lying, x10', every 1-2hrs/waking hrs of day      Therapeutic Exercise Summary: GERIATRIC CONDITIONING  [WFRFYNC]    ELASTIC BAND WRIST EXTENSION  -  Repeat 10 Times, Hold 1 Second(s), Complete 1 Set, Perform 1 Times a Day    ELASTIC BAND WRIST SUPINATION -  Repeat 12 Times, Hold 1 Second(s), Complete 1 Set, Perform 1 Times a Week    GERIATRIC - ELASTIC BAND TRICEPS EXTENSION -  Repeat 1 Time, Hold 1 Second(s), Complete 1 Set, Perform 1 Times a Day    Partial Sit to Stand for Unilateral UE Weight Bearing -  Repeat 5 Times, Perform 1 Times a Day      Time-based treatments/modalities:         ASSESSMENT:   Response to treatment: see PN    PLAN/RECOMMENDATIONS: Pt. To have an OT consult  Plan for treatment: therapy treatment to continue next visit.  Planned interventions for next visit: continue with current treatment.         "

## 2024-02-09 NOTE — OP THERAPY DAILY TREATMENT
Outpatient Physical Therapy  DAILY TREATMENT     [facility] Physical [address].  [address]  [place] [address]  Phone:  [phone]  Fax:  [phone]    Date: 02/09/2024    Patient: Iveth Daley  YOB: 1960  MRN: [id number]     Time Calculation    Start time: 0816  Stop time: 0905 Time Calculation (min): 49 minutes         Chief Complaint: No chief complaint on file.    Visit #: 4    SUBJECTIVE:  Pt. Reports seeing a specialist and is going to do further tests. She is looking into surgery options.     Location: R hip posterior Description:  radiates to R glut lateral thigh to knee n.Constant pain  Eases: aspircream     OBJECTIVE:  Current objective measures:  Lumbar AROM  Ext to neutral, pain across B SIJ  SB L 25%  SB R 25%             Therapeutic Exercises (CPT 15929):     1. wall stretch for L/S Ext, SB    2. HSS VS ITB    3. lat. lean wall L bend    4. Sup HL KFO    5. Sup HL HS    6. Sustained prone-supported w/pillows, x 15', decr sx, numbness and tingling    Therapeutic Treatments and Modalities:     1. Self Care ADL Training (CPT 24622), hep, see summary    2. Hot or Cold Pack Therapy (CPT 52902), L/S-gluteals, w/ ex. #6 - N/C    Therapeutic Treatment and Modalities Summary: BACK HIP STRETCHES  [[id number]] -sent to: [email or web address]    Trunk Side bend - ITB stretch -  Repeat 5 Repetitions, Hold 10 Seconds, Complete 1 Set, Perform 3 Times a Day    Standing Lumbar Extension -  Repeat 5 Repetitions, Hold 10 Seconds, Complete 1 Set, Perform 3 Times a Day    PELVIC SHIFT STANDING - WALL -  Repeat 5 Repetitions, Hold 10 Seconds, Complete 1 Set, Perform 3 Times a Day    Glut - Piriformis Stretch - Supine Hip flex with adduction  -  Repeat 1 Repetition, Hold 30 Seconds, Complete 3 Sets, Perform 3 Times a Day    HAMSTRING STRETCH WITH MULTI-LOOP STRAP -  Repeat 1 Repetition, Hold 30 Seconds, Complete 3 Sets, Perform 3 Times a Day    Time-based  treatments/modalities:  Physical Therapy Timed Treatment Charges  Functional training, self care minutes (CPT 84799): 10 minutes  Therapeutic exercise minutes (CPT 38561): 28 minutes      Pain rating (1-10) before treatment:  3-4  Pain rating (1-10) after treatment: 1- 2    ASSESSMENT:   Response to treatment: tolerates the TE and [place] stretch.  Increase back SB to equal o/s and reduced sx..      PLAN/RECOMMENDATIONS:   Plan for treatment: therapy treatment to continue next visit.  Planned interventions for next visit: continue with current treatment.

## 2024-02-13 ENCOUNTER — OCCUPATIONAL THERAPY (OUTPATIENT)
Dept: OCCUPATIONAL THERAPY | Facility: REHABILITATION | Age: 64
End: 2024-02-13
Attending: INTERNAL MEDICINE
Payer: MEDICARE

## 2024-02-13 DIAGNOSIS — M25.539 PAIN IN THE WRIST, UNSPECIFIED LATERALITY: ICD-10-CM

## 2024-02-13 PROCEDURE — 97110 THERAPEUTIC EXERCISES: CPT

## 2024-02-13 PROCEDURE — 97140 MANUAL THERAPY 1/> REGIONS: CPT

## 2024-02-13 PROCEDURE — 97530 THERAPEUTIC ACTIVITIES: CPT

## 2024-02-13 NOTE — OP THERAPY DAILY TREATMENT
Outpatient Occupational Therapy  DAILY TREATMENT     [facility] Occupational [address].  [address]  [place] [address]  Phone:  [phone]  Fax:  [phone]    Date: 02/13/2024    Patient: Iveth Daley  YOB: 1960  MRN: [id number]     Time Calculation  Start time: 1100  Stop time: 1145 Time Calculation (min): 45 minutes         Chief Complaint: Arm Problem    Visit #: 2    SUBJECTIVE:  Patient reports no significant changes, has been doing new exercises at home, still has persistent wrist pain with R hand use    OBJECTIVE:  Current objective measures:         Therapeutic Exercises (CPT 42019):     1. digit flexion/extension, x20 reps, digit spacers for decreased neural tension    2. adductor release via chip clip, 2:00 min, R hand    3. ball roll on plate for proprioceptive feedback, 3:00 min, R hand    Therapeutic Treatments and Modalities:    1. Manual Therapy (CPT 39978), RUE, cupping gun to R wrist volar side and thenar eminence    2. Therapeutic Activities (CPT 41726), RUE, kinesiotape applied to R wrist volarly for lift    Time-based treatments/modalities:  Therapeutic exercise minutes (CPT 13264): 15 minutes  Therapeutic activity minutes (CPT 24489): 15 minutes  Manual therapy joint mobilization minutes (CPT 22714): 15 minutes        ASSESSMENT:   Response to treatment: Patient doing well, observable increase in wrist extension after cupping gun to volar wrist, reduced pain with this movement though pain still present, kinesiotape applied to facilitate lift and increased blood flow, pt liked the feeling of the kinesiotape, educated on how to apply.     PLAN/RECOMMENDATIONS:   Plan for treatment: therapy treatment to continue next visit.  Planned interventions for next visit: continue with current treatment

## 2024-02-20 ENCOUNTER — APPOINTMENT (OUTPATIENT)
Dept: OCCUPATIONAL THERAPY | Facility: REHABILITATION | Age: 64
End: 2024-02-20
Attending: INTERNAL MEDICINE
Payer: MEDICARE

## 2024-02-21 ENCOUNTER — PHYSICAL THERAPY (OUTPATIENT)
Dept: PHYSICAL THERAPY | Facility: REHABILITATION | Age: 64
End: 2024-02-21
Attending: INTERNAL MEDICINE
Payer: MEDICARE

## 2024-02-21 DIAGNOSIS — M25.551 CHRONIC HIP PAIN, RIGHT: ICD-10-CM

## 2024-02-21 DIAGNOSIS — G89.29 CHRONIC HIP PAIN, RIGHT: ICD-10-CM

## 2024-02-21 PROCEDURE — 97535 SELF CARE MNGMENT TRAINING: CPT

## 2024-02-21 PROCEDURE — 97110 THERAPEUTIC EXERCISES: CPT

## 2024-02-21 NOTE — OP THERAPY DAILY TREATMENT
Outpatient Physical Therapy  DAILY TREATMENT     [facility] Physical [address].  [address]  [place] [address]  Phone:  [phone]  Fax:  [phone]    Date: 02/21/2024    Patient: Iveth Daley  YOB: 1960  MRN: [id number]     Time Calculation    Start time: 1532  Stop time: 1610 Time Calculation (min): 38 minutes         Chief Complaint: No chief complaint on file.    Visit #: 5    SUBJECTIVE:  Pt. Reports she felt ok after the last rx w/stretching and heat. She is walking more. She is going to a specialist for injections in her back on Friday. Didn't get hep yet.    Location: R hip posterior Description:  radiates to R glut lateral thigh to knee n.Constant pain  Eases: aspercream, OTC/Tylenol     OBJECTIVE:  Current objective measures:  Lumbar AROM  Ext to neutral, pain across B SIJ            Therapeutic Exercises (CPT 79860):     1. seated ball roll outs, 14 x ea.    2. HSS VS ITB    3. lat. lean wall L bend    4. Sup HL KFO, 5, 5, VC/TC    5. supine TvA, 10, VC/TC; towel prop low L/S    6. Sustained prone-supported w/pillows, -    7. bridge w/FT on ball, 5-6    Therapeutic Treatments and Modalities:     1. Self Care ADL Training (CPT 52123), hep - printed, see summary    2. Hot or Cold Pack Therapy (CPT 88386), pt refused will do at home    Therapeutic Treatment and Modalities Summary: BACK HIP STRETCHES  BACK HIP STRETCHES  [[id number]] [email or web address]: [email or web address]    Trunk Side bend - ITB stretch -  Repeat 5 Repetitions, Hold 10 Seconds, Complete 1 Set, Perform 3 Times a Day    Standing Lumbar Extension -  Repeat 5 Repetitions, Hold 10 Seconds, Complete 1 Set, Perform 3 Times a Day    PELVIC SHIFT STANDING - WALL -  Repeat 5 Repetitions, Hold 10 Seconds, Complete 1 Set, Perform 3 Times a Day    Glut - Piriformis Stretch - Supine Hip flex with adduction  -  Repeat 1 Repetition, Hold 30 Seconds, Complete 3 Sets, Perform 3 Times a  Day    HAMSTRING STRETCH WITH MULTI-LOOP STRAP -  Repeat 1 Repetition, Hold 30 Seconds, Complete 3 Sets, Perform 3 Times a Day    Time-based treatments/modalities:  Physical Therapy Timed Treatment Charges  Functional training, self care minutes (CPT 52857): 8 minutes  Therapeutic exercise minutes (CPT 77153): 30 minutes      Pain rating (1-10) before treatment:  3-4  Pain rating (1-10) after treatment: 3-4    ASSESSMENT:   Response to treatment: tolerates the spinal stabilization exercises .  Issued hep handout.      PLAN/RECOMMENDATIONS:   Plan for treatment: therapy treatment to continue next visit.  Planned interventions for next visit: continue with current treatment.

## 2024-02-23 DIAGNOSIS — G25.2 FINE TREMOR: ICD-10-CM

## 2024-02-23 RX ORDER — PROPRANOLOL HYDROCHLORIDE 10 MG/1
10 TABLET ORAL 3 TIMES DAILY
Qty: 270 TABLET | Refills: 3 | Status: SHIPPED | OUTPATIENT
Start: 2024-02-23

## 2024-02-23 NOTE — TELEPHONE ENCOUNTER
Received request via: Pharmacy    Was the patient seen in the last year in this department? Yes    Does the patient have an active prescription (recently filled or refills available) for medication(s) requested? No    Pharmacy Name: [facility]     Does the patient have [facility] Plus and need 100 day supply (blood pressure, diabetes and cholesterol meds only)? Patient does not have SCP

## 2024-02-24 DIAGNOSIS — R45.86 MOOD SWINGS: ICD-10-CM

## 2024-02-24 DIAGNOSIS — F33.0 MILD EPISODE OF RECURRENT MAJOR DEPRESSIVE DISORDER (HCC): ICD-10-CM

## 2024-02-26 ENCOUNTER — APPOINTMENT (OUTPATIENT)
Dept: PHYSICAL THERAPY | Facility: REHABILITATION | Age: 64
End: 2024-02-26
Attending: INTERNAL MEDICINE
Payer: MEDICARE

## 2024-02-26 RX ORDER — SERTRALINE HYDROCHLORIDE 25 MG/1
TABLET, FILM COATED ORAL
Qty: 45 TABLET | Refills: 3 | Status: SHIPPED | OUTPATIENT
Start: 2024-02-26 | End: 2024-03-05

## 2024-02-26 NOTE — TELEPHONE ENCOUNTER
Received request via: Pharmacy    Was the patient seen in the last year in this department? Yes    Does the patient have an active prescription (recently filled or refills available) for medication(s) requested? No    Pharmacy Name: [facility] #[id number]    Does the patient have alf Plus and need 100 day supply (blood pressure, diabetes and cholesterol meds only)? Patient does not have SCP

## 2024-02-27 ENCOUNTER — OCCUPATIONAL THERAPY (OUTPATIENT)
Dept: OCCUPATIONAL THERAPY | Facility: REHABILITATION | Age: 64
End: 2024-02-27
Attending: INTERNAL MEDICINE
Payer: MEDICARE

## 2024-02-27 DIAGNOSIS — M25.539 PAIN IN THE WRIST, UNSPECIFIED LATERALITY: ICD-10-CM

## 2024-02-27 DIAGNOSIS — M79.643 PAIN OF HAND, UNSPECIFIED LATERALITY: ICD-10-CM

## 2024-02-27 PROCEDURE — 97140 MANUAL THERAPY 1/> REGIONS: CPT

## 2024-02-27 PROCEDURE — 97530 THERAPEUTIC ACTIVITIES: CPT

## 2024-02-27 PROCEDURE — 97110 THERAPEUTIC EXERCISES: CPT

## 2024-02-27 NOTE — OP THERAPY DAILY TREATMENT
Outpatient Occupational Therapy  DAILY TREATMENT     [facility] Therapy [address]  [address]  [place] [address]  Phone:  [phone]  Fax:  [phone]    Date: 02/27/2024    Patient: Iveth Daley  YOB: 1960  MRN: [id number]     Time Calculation  Start time: 1100  Stop time: 1145 Time Calculation (min): 45 minutes         Chief Complaint: Arm Problem    Visit #: 3    SUBJECTIVE:  Patient reports compliance with HEP, was able to open a water bottle yesterday without assistance though this did trigger a bit of pain    OBJECTIVE:  Current objective measures:       Therapeutic Exercises (CPT 00008):     1. digit flexion/extension, x20 reps, digit spacers for decreased neural tension    2. adductor release via chip clip, 2:00 min, R hand    3. ball roll on plate for proprioceptive feedback, 3:00 min, R hand    4. wrist extension, x15 reps, R hand, initial pain on volar crease and ulnarly, reduced after cupping gun and taping    5. forearm supination, 2x15 reps, BUE, initial discomfort in thumb, improved with additional of chip clip to adductor, added to HEP    Therapeutic Treatments and Modalities:    1. Manual Therapy (CPT 25047), RUE, cupping gun to R wrist volar side and thenar eminence    2. Therapeutic Activities (CPT 52535), RUE, kinesiotape applied to R wrist volarly for distal dermal facilitaiton, additional tape applied to facilitate proximal carpal row ulnarly    Time-based treatments/modalities:             ASSESSMENT:   Response to treatment: Patient tolerating treatment well, continues with wrist pain with extension, we were able to reduce this within session, advised to target wrist extension while tape still stabilizing joint for next 1-3 days. She was observed to resist being in supinated position and would alter elbow position to avoid it, when targeted she reported pain in thumb, we reduced this with additional of chip clip, added this to her  HEP. Will continue to monitor and progress as tolerated.     PLAN/RECOMMENDATIONS:   Plan for treatment: therapy treatment to continue next visit.  Planned interventions for next visit: continue with current treatment

## 2024-02-28 ENCOUNTER — APPOINTMENT (OUTPATIENT)
Dept: PHYSICAL THERAPY | Facility: REHABILITATION | Age: 64
End: 2024-02-28
Attending: INTERNAL MEDICINE
Payer: MEDICARE

## 2024-03-05 ENCOUNTER — OCCUPATIONAL THERAPY (OUTPATIENT)
Dept: OCCUPATIONAL THERAPY | Facility: REHABILITATION | Age: 64
End: 2024-03-05
Attending: INTERNAL MEDICINE
Payer: MEDICARE

## 2024-03-05 ENCOUNTER — OFFICE VISIT (OUTPATIENT)
Dept: BEHAVIORAL HEALTH | Facility: CLINIC | Age: 64
End: 2024-03-05
Payer: MEDICARE

## 2024-03-05 DIAGNOSIS — F39 MILD MOOD DISORDER (HCC): ICD-10-CM

## 2024-03-05 DIAGNOSIS — F41.1 GENERALIZED ANXIETY DISORDER: ICD-10-CM

## 2024-03-05 DIAGNOSIS — M25.539 PAIN IN THE WRIST, UNSPECIFIED LATERALITY: ICD-10-CM

## 2024-03-05 DIAGNOSIS — F33.1 MAJOR DEPRESSIVE DISORDER, RECURRENT EPISODE, MODERATE (HCC): ICD-10-CM

## 2024-03-05 DIAGNOSIS — M79.643 PAIN OF HAND, UNSPECIFIED LATERALITY: ICD-10-CM

## 2024-03-05 PROCEDURE — 90792 PSYCH DIAG EVAL W/MED SRVCS: CPT | Performed by: PSYCHIATRY & NEUROLOGY

## 2024-03-05 PROCEDURE — 97140 MANUAL THERAPY 1/> REGIONS: CPT

## 2024-03-05 PROCEDURE — 97110 THERAPEUTIC EXERCISES: CPT

## 2024-03-05 PROCEDURE — 97530 THERAPEUTIC ACTIVITIES: CPT

## 2024-03-05 RX ORDER — LORAZEPAM 1 MG/1
1 TABLET ORAL DAILY
Qty: 30 TABLET | Refills: 0 | Status: SHIPPED | OUTPATIENT
Start: 2024-03-05 | End: 2024-04-04

## 2024-03-05 RX ORDER — CITALOPRAM HYDROBROMIDE 10 MG/1
10 TABLET ORAL NIGHTLY
Qty: 30 TABLET | Refills: 0 | Status: SHIPPED | OUTPATIENT
Start: 2024-03-05 | End: 2024-04-04
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
## 2024-03-05 NOTE — OP THERAPY PROGRESS SUMMARY
Outpatient Occupational Therapy  PROGRESS SUMMARY NOTE    [facility] Occupational Therapy [address]  [address]  [place] [address]  Phone:  [phone]  Fax:  [phone]    Date of Visit: 03/05/2024    Patient: Iveth Daley  YOB: 1960  MRN: [id number]     Referring Provider: Abhinav [place] [address]   Referring Diagnosis Pain in unspecified wrist [M25.539];Pain in unspecified hand [M79.643]     Visit Diagnoses     ICD-10-CM   1. Pain in the wrist, unspecified laterality  M25.539   2. Pain of hand, unspecified laterality  M79.643       Rehab Potential: good    Progress Report Period: 1/30/24-3/5/24    Functional Assessment Used    Active Range of Motion      Right Wrist   Wrist flexion: with pain 4/10  Wrist extension: with pain 4/10  Radial deviation: with pain 4/10  Ulnar deviation: with pain 4/10     Right Thumb   Opposition: Tendency toward mcp collapse with opposition     Additional Active Range of Motion Details  Pain on ulnar side of wrist with ext/flex, pain on radial side of wrist with radial/ulnar deviation     Strength      Unable to re-test  strength due to dynamometer out for recalibration       Objective Findings and Assessment:   Patient progression towards goals: Short Term Goals:  decrease pain PROGRESSING  increase soft tissue/skin mobility PROGRESSING, improved range for wrist extension  increase strength UNABLE TO RE-TEST TODAY  independent with HEP performance MET, updated today     Long Term Goals:   Patient will improve score on UEFI from 38/80 to 60/80 to indicate improving hand function  Patient will have pain free AROM of R wrist PROGRESSING, reports reduced pain but not cessation  Patient will report no pain with making the bed PROGRESSING, reports reduced pain but not cessation  Patient will report no pain with doing laundry PROGRESSING, reports reduced pain but not cessation      Objective  findings and assessment details: Patient overall making progress, she continues with wrist pain that is fairly consistently 4/10 whereas at evaluation she reported consistent 7/10 pain. 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 Anticipate continued progress toward pain free functional use of R dominant hand and wrist.     Goals:   Short Term Goals:   Patient will improve  strength in RUE to 38# or higher to indicate improving hand function  Patient will report no pain with making the bed   Patient will report no pain with doing laundry    Short term goal timespan:  2-4 weeks    Long Term Goals:   Patient will improve score on UEFI from 38/80 to 60/80 to indicate improving hand function  Patient will have pain free AROM of R wrist  Patient will be independent with long term HEP for management of wrist mobility and CMC stability  Long term goal timespan:  6-8 weeks    Plan:   Planned therapy interventions:  Therapeutic Exercise (CPT 85037), Therapeutic Activities (CPT 12994), Manual Therapy (CPT 72406) and Hot or Cold Pack Therapy (CPT 89074)  Frequency:  1x week  Duration in weeks:  6  Duration in visits:  6  Plan details:  Progress pain free use of RUE      Referring provider co-signature:  I have reviewed this plan of care and my co-signature certifies the need for services.    Certification Period: 03/05/2024 to 06/03/24    Physician Signature: ________________________________ Date: ______________

## 2024-03-05 NOTE — OP THERAPY DAILY TREATMENT
Outpatient Occupational Therapy  DAILY TREATMENT     [facility] Occupational [address].  [address]  [place] [address]  Phone:  [phone]  Fax:  [phone]    Date: 03/05/2024    Patient: Iveth Daley  YOB: 1960  MRN: [id number]     Time Calculation  Start time: 1100  Stop time: 1145 Time Calculation (min): 45 minutes         Chief Complaint: Arm Problem    Visit #: 4    SUBJECTIVE:  Patient reports things are good, feels her pain is reduced overall but is still present     OBJECTIVE:  Current objective measures: see progress note        Therapeutic Exercises (CPT 78205):     1. digit flexion/extension, x20 reps, digit spacers for decreased neural tension, not completed today    2. adductor release via chip clip, 2:00 min, R hand    3. ball roll on plate for proprioceptive feedback, 3:00 min, R hand, not completed today    4. wrist extension, x15 reps, RUE, not completed today    5. forearm supination, 2x15 reps, BUE    6. thumb abduction, x15, RUE, light rubber band for resistance    7. index abduction, x15, RUE, light rubber band for resistance    8. index lift offs on lacross ball, x15, RUE, light rubber band for resistance    9. digit extension, x15, RUE, light rubber band for resistance    10. c position hold on tennis ball, 2 x :15, RUE    Therapeutic Exercise Summary:  Printed new exercises for carryover, advised she could do on both hands as arthritis present bilaterally with starting signs of MCP medial migration    Therapeutic Treatments and Modalities:    1. Manual Therapy (CPT 01996), RUE, cupping gun to R wrist volar side and thenar eminence    2. Therapeutic Activities (CPT 72589), RUE, kinesiotape applied to R wrist volarly for distal dermal facilitaiton, additional tape applied to facilitate proximal carpal row ulnarly    Time-based treatments/modalities:           Pain rating before treatment: 0 at rest, 4 with movement  Pain rating after  treatment: 0 at rest, 4 with movement    ASSESSMENT:   Response to treatment: Patient overall making progress, she continues with wrist pain that is fairly consistently 4/10 whereas at evaluation she reported consistent 7/10 pain. 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 Anticipate continued progress toward pain free functional use of R dominant hand and wrist.      PLAN/RECOMMENDATIONS:   Plan for treatment: therapy treatment to continue next visit.  Planned interventions for next visit: continue with current treatment

## 2024-03-06 ENCOUNTER — APPOINTMENT (OUTPATIENT)
Dept: PHYSICAL THERAPY | Facility: REHABILITATION | Age: 64
End: 2024-03-06
Attending: INTERNAL MEDICINE
Payer: MEDICARE

## 2024-03-06 DIAGNOSIS — F33.0 MILD EPISODE OF RECURRENT MAJOR DEPRESSIVE DISORDER (HCC): ICD-10-CM

## 2024-03-06 RX ORDER — BUSPIRONE HYDROCHLORIDE 5 MG/1
5 TABLET ORAL DAILY
Qty: 90 TABLET | Refills: 0 | Status: SHIPPED | OUTPATIENT
Start: 2024-03-06 | End: 2024-06-04

## 2024-03-06 NOTE — TELEPHONE ENCOUNTER
Received request via: Patient    Was the patient seen in the last year in this department? Yes    Does the patient have an active prescription (recently filled or refills available) for medication(s) requested? No    Pharmacy Name: [facility]     Does the patient have [facility] and need 100 day supply (blood pressure, diabetes and cholesterol meds only)? Medication is not for cholesterol, blood pressure or diabetes and Patient does not have SCP

## 2024-03-12 ENCOUNTER — APPOINTMENT (OUTPATIENT)
Dept: OCCUPATIONAL THERAPY | Facility: REHABILITATION | Age: 64
End: 2024-03-12
Attending: INTERNAL MEDICINE
Payer: MEDICARE

## 2024-03-14 ENCOUNTER — APPOINTMENT (OUTPATIENT)
Dept: PHYSICAL THERAPY | Facility: REHABILITATION | Age: 64
End: 2024-03-14
Attending: INTERNAL MEDICINE
Payer: MEDICARE

## 2024-03-15 ENCOUNTER — OFFICE VISIT (OUTPATIENT)
Dept: MEDICAL GROUP | Facility: IMAGING CENTER | Age: 64
End: 2024-03-15
Payer: MEDICARE

## 2024-03-15 VITALS
RESPIRATION RATE: 14 BRPM | BODY MASS INDEX: 49.26 KG/M2 | HEIGHT: 63 IN | HEART RATE: 75 BPM | DIASTOLIC BLOOD PRESSURE: 76 MMHG | OXYGEN SATURATION: 96 % | WEIGHT: 278 LBS | TEMPERATURE: 97.4 F | SYSTOLIC BLOOD PRESSURE: 106 MMHG

## 2024-03-15 DIAGNOSIS — S52.501P CLOSED FRACTURE OF DISTAL END OF RIGHT RADIUS WITH MALUNION, UNSPECIFIED FRACTURE MORPHOLOGY, SUBSEQUENT ENCOUNTER: ICD-10-CM

## 2024-03-15 DIAGNOSIS — R14.0 ABDOMINAL BLOATING: ICD-10-CM

## 2024-03-15 PROCEDURE — 3074F SYST BP LT 130 MM HG: CPT | Performed by: INTERNAL MEDICINE

## 2024-03-15 PROCEDURE — 99213 OFFICE O/P EST LOW 20 MIN: CPT | Performed by: INTERNAL MEDICINE

## 2024-03-15 PROCEDURE — 3078F DIAST BP <80 MM HG: CPT | Performed by: INTERNAL MEDICINE

## 2024-03-15 NOTE — ASSESSMENT & PLAN NOTE
7/7/2023 right wrist x ray: stable alignment of the distal radius fractures. No complicating features.    She had GLF and had right radius fractures in [place] in May 2023. She has been doing occupational therapy.   She still has persistent wrist pain and wrist weakness.        She is doing OT and following with orthopedics

## 2024-03-15 NOTE — PROGRESS NOTES
"Established Patient    Iveth Daley is a 63 y.o. female who presents today with the following:    CC:   Chief Complaint   Patient presents with    Follow-Up     Seeing all the specalist from pt to therapists        HPI:       Problem   Abdominal Bloating    Her abdominal bloating and gas has been controlled with omeprazole and Pepcid. She may need GI evaluation if symptoms persist.      Closed Fracture of Distal End of Right Radius With Malunion    7/7/2023 right wrist x ray: stable alignment of the distal radius fractures. No complicating features.    She had GLF and had right radius fractures in [place] in May 2023. She has been doing occupational therapy.   She still has persistent wrist pain and wrist weakness.        She is doing OT and following with orthopedics          Current Outpatient Medications   Medication Sig    busPIRone (BUSPAR) 5 MG tablet Take 1 Tablet by mouth every day for 90 days.    citalopram (CELEXA) 10 MG tablet Take 1 Tablet by mouth every evening for 30 days.    LORazepam (ATIVAN) 1 MG Tab Take 1 Tablet by mouth every day at 6 PM for 30 days.    propranolol (INDERAL) 10 MG Tab Take 1 Tablet by mouth 3 times a day.    omeprazole (PRILOSEC) 40 MG delayed-release capsule TAKE 1 CAPSULE BY MOUTH EVERY DAY    polyethylene glycol/lytes (MIRALAX) Pack Take 17 g by mouth every day.    Wheat Dextrin (BENEFIBER PO) Take  by mouth.    acetaminophen (TYLENOL) 325 MG Tab 325 mg.    DEPAKOTE  MG TABLET SR 24 HR Take 6 Tablets by mouth every day.     Allergies   Allergen Reactions    Codeine Shortness of Breath    Sulfa Drugs Rash       Allergies, past medical history, past surgical history, medications, family history, social history reviewed and updated.    ROS Please see HPI    Physical Exam  Vitals: /76 (BP Location: Left arm, Patient Position: Sitting, BP Cuff Size: Adult)   Pulse 75   Temp 36.3 °C (97.4 °F) (Temporal)   Resp 14   Ht 1.6 m (5' 3\")   Wt (!) 126 kg (278 lb) "   SpO2 96%   BMI 49.25 kg/m²   Physical Exam  Constitutional:       Appearance: Normal appearance.   HENT:      Head: Normocephalic and atraumatic.      Right Ear: External ear normal.      Left Ear: External ear normal.      Nose: Nose normal.      Mouth/Throat:      Pharynx: Oropharynx is clear.   Cardiovascular:      Rate and Rhythm: Normal rate and regular rhythm.      Pulses: Normal pulses.   Pulmonary:      Effort: Pulmonary effort is normal.      Breath sounds: Normal breath sounds.   Abdominal:      General: Bowel sounds are normal.      Palpations: Abdomen is soft.      Tenderness: There is no abdominal tenderness.   Musculoskeletal:      Cervical back: Neck supple.      Right lower leg: No edema.      Left lower leg: No edema.   Skin:     General: Skin is warm and dry.   Neurological:      Mental Status: She is alert. Mental status is at baseline.   Psychiatric:         Mood and Affect: Mood normal.         Behavior: Behavior normal.         Thought Content: Thought content normal.         Judgment: Judgment normal.             Assessment and Plan    1. Closed fracture of distal end of right radius with malunion, unspecified fracture morphology, subsequent encounter  7/7/2023 right wrist x ray: stable alignment of the distal radius fractures. No complicating features.    She had GLF and had right radius fractures in [place] in May 2023. She has been doing occupational therapy.   She still has persistent wrist pain and wrist weakness.        She is doing OT and following with orthopedics    2. Abdominal bloating  Her abdominal bloating and gas has been controlled with omeprazole and Pepcid. She may need GI evaluation if symptoms persist.         Follow-up:Return in about 3 weeks (around 4/5/2024), or if symptoms worsen or fail to improve.    This note was created using voice recognition software. There may be unintended errors in spelling, grammar or content.

## 2024-03-15 NOTE — ASSESSMENT & PLAN NOTE
Her abdominal bloating and gas has been controlled with omeprazole and Pepcid. She may need GI evaluation if symptoms persist.

## 2024-03-19 ENCOUNTER — OCCUPATIONAL THERAPY (OUTPATIENT)
Dept: OCCUPATIONAL THERAPY | Facility: REHABILITATION | Age: 64
End: 2024-03-19
Attending: INTERNAL MEDICINE
Payer: MEDICARE

## 2024-03-19 ENCOUNTER — HOSPITAL ENCOUNTER (OUTPATIENT)
Dept: LAB | Facility: MEDICAL CENTER | Age: 64
End: 2024-03-19
Attending: INTERNAL MEDICINE
Payer: MEDICARE

## 2024-03-19 DIAGNOSIS — M25.539 PAIN IN THE WRIST, UNSPECIFIED LATERALITY: ICD-10-CM

## 2024-03-19 DIAGNOSIS — G25.2 FINE TREMOR: ICD-10-CM

## 2024-03-19 DIAGNOSIS — Z13.6 SCREENING FOR CARDIOVASCULAR CONDITION: ICD-10-CM

## 2024-03-19 DIAGNOSIS — Z11.59 NEED FOR HEPATITIS C SCREENING TEST: ICD-10-CM

## 2024-03-19 DIAGNOSIS — Z51.81 ENCOUNTER FOR MEDICATION MONITORING: ICD-10-CM

## 2024-03-19 DIAGNOSIS — Z11.4 SCREENING FOR HIV (HUMAN IMMUNODEFICIENCY VIRUS): ICD-10-CM

## 2024-03-19 LAB
ALBUMIN SERPL BCP-MCNC: 4.2 G/DL (ref 3.2–4.9)
ALBUMIN/GLOB SERPL: 1.7 G/DL
ALP SERPL-CCNC: 56 U/L (ref 30–99)
ALT SERPL-CCNC: 6 U/L (ref 2–50)
AMMONIA PLAS-SCNC: 33 UMOL/L (ref 11–45)
ANION GAP SERPL CALC-SCNC: 13 MMOL/L (ref 7–16)
AST SERPL-CCNC: 14 U/L (ref 12–45)
BASOPHILS # BLD AUTO: 0.5 % (ref 0–1.8)
BASOPHILS # BLD: 0.03 K/UL (ref 0–0.12)
BILIRUB SERPL-MCNC: 0.3 MG/DL (ref 0.1–1.5)
BUN SERPL-MCNC: 8 MG/DL (ref 8–22)
CALCIUM ALBUM COR SERPL-MCNC: 9.1 MG/DL (ref 8.5–10.5)
CALCIUM SERPL-MCNC: 9.3 MG/DL (ref 8.5–10.5)
CHLORIDE SERPL-SCNC: 100 MMOL/L (ref 96–112)
CHOLEST SERPL-MCNC: 177 MG/DL (ref 100–199)
CO2 SERPL-SCNC: 24 MMOL/L (ref 20–33)
CREAT SERPL-MCNC: 0.58 MG/DL (ref 0.5–1.4)
EOSINOPHIL # BLD AUTO: 0.03 K/UL (ref 0–0.51)
EOSINOPHIL NFR BLD: 0.5 % (ref 0–6.9)
ERYTHROCYTE [DISTWIDTH] IN BLOOD BY AUTOMATED COUNT: 47.5 FL (ref 35.9–50)
FASTING STATUS PATIENT QL REPORTED: NORMAL
GFR SERPLBLD CREATININE-BSD FMLA CKD-EPI: 100 ML/MIN/1.73 M 2
GLOBULIN SER CALC-MCNC: 2.5 G/DL (ref 1.9–3.5)
GLUCOSE SERPL-MCNC: 92 MG/DL (ref 65–99)
HCT VFR BLD AUTO: 45.8 % (ref 37–47)
HCV AB SER QL: NORMAL
HDLC SERPL-MCNC: 56 MG/DL
HGB BLD-MCNC: 15.7 G/DL (ref 12–16)
HIV 1+2 AB+HIV1 P24 AG SERPL QL IA: NORMAL
IMM GRANULOCYTES # BLD AUTO: 0.02 K/UL (ref 0–0.11)
IMM GRANULOCYTES NFR BLD AUTO: 0.3 % (ref 0–0.9)
LDLC SERPL CALC-MCNC: 103 MG/DL
LYMPHOCYTES # BLD AUTO: 2.43 K/UL (ref 1–4.8)
LYMPHOCYTES NFR BLD: 41.9 % (ref 22–41)
MCH RBC QN AUTO: 32.9 PG (ref 27–33)
MCHC RBC AUTO-ENTMCNC: 34.3 G/DL (ref 32.2–35.5)
MCV RBC AUTO: 96 FL (ref 81.4–97.8)
MONOCYTES # BLD AUTO: 0.51 K/UL (ref 0–0.85)
MONOCYTES NFR BLD AUTO: 8.8 % (ref 0–13.4)
NEUTROPHILS # BLD AUTO: 2.78 K/UL (ref 1.82–7.42)
NEUTROPHILS NFR BLD: 48 % (ref 44–72)
NRBC # BLD AUTO: 0 K/UL
NRBC BLD-RTO: 0 /100 WBC (ref 0–0.2)
PLATELET # BLD AUTO: 274 K/UL (ref 164–446)
PMV BLD AUTO: 9.5 FL (ref 9–12.9)
POTASSIUM SERPL-SCNC: 4.6 MMOL/L (ref 3.6–5.5)
PROT SERPL-MCNC: 6.7 G/DL (ref 6–8.2)
RBC # BLD AUTO: 4.77 M/UL (ref 4.2–5.4)
SODIUM SERPL-SCNC: 137 MMOL/L (ref 135–145)
TRIGL SERPL-MCNC: 92 MG/DL (ref 0–149)
TSH SERPL DL<=0.005 MIU/L-ACNC: 0.86 UIU/ML (ref 0.38–5.33)
WBC # BLD AUTO: 5.8 K/UL (ref 4.8–10.8)

## 2024-03-19 PROCEDURE — 80061 LIPID PANEL: CPT

## 2024-03-19 PROCEDURE — 97110 THERAPEUTIC EXERCISES: CPT

## 2024-03-19 PROCEDURE — 84443 ASSAY THYROID STIM HORMONE: CPT

## 2024-03-19 PROCEDURE — 36415 COLL VENOUS BLD VENIPUNCTURE: CPT | Mod: GA

## 2024-03-19 PROCEDURE — 85025 COMPLETE CBC W/AUTO DIFF WBC: CPT

## 2024-03-19 PROCEDURE — 82140 ASSAY OF AMMONIA: CPT

## 2024-03-19 PROCEDURE — 80053 COMPREHEN METABOLIC PANEL: CPT

## 2024-03-19 PROCEDURE — 97140 MANUAL THERAPY 1/> REGIONS: CPT

## 2024-03-19 PROCEDURE — 86803 HEPATITIS C AB TEST: CPT

## 2024-03-19 PROCEDURE — 97530 THERAPEUTIC ACTIVITIES: CPT

## 2024-03-19 PROCEDURE — G0475 HIV COMBINATION ASSAY: HCPCS | Mod: GA

## 2024-03-19 NOTE — OP THERAPY DAILY TREATMENT
Outpatient Occupational Therapy  DAILY TREATMENT     [facility] Occupational [address]  [address]  [place] [address]  Phone:  [phone]  Fax:  [phone]    Date: 03/19/2024    Patient: Iveth Daley  YOB: 1960  MRN: [id number]     Time Calculation  Start time: 1100  Stop time: 1145 Time Calculation (min): 45 minutes         Chief Complaint: Arm Problem    Visit #: 5    SUBJECTIVE:  Patient reports R wrist/hand overall improving, can do more grasping with it of small objects without pain or feelings of weakness, still limited relative to baseline and to L hand function    OBJECTIVE:  Current objective measures:       Therapeutic Exercises (CPT 84674):     1. digit flexion/extension, x20 reps, digit spacers for decreased neural tension, not completed today    2. adductor release via chip clip, 2:00 min, R hand    3. ball roll on plate for proprioceptive feedback, 3:00 min, R hand, not completed today    4. wrist extension, x15 reps, RUE, not completed today    5. forearm supination, 2x15 reps, BUE    6. thumb abduction, x15, RUE, light rubber band for resistance    7. index abduction, x15, RUE, light rubber band for resistance    8. index lift offs on lacross ball, x15, RUE, light rubber band for resistance    9. digit extension, x15, RUE, light rubber band for resistance    10. c position hold on tennis ball, 2 x :15, RUE    Therapeutic Treatments and Modalities:    1. Manual Therapy (CPT 41385), RUE, cupping gun to R wrist volar side and thenar eminence    2. Therapeutic Activities (CPT 77166), RUE, kinesiotape applied to R wrist volarly for distal dermal facilitaiton, additional tape applied to facilitate proximal carpal row ulnarly    Time-based treatments/modalities:           ASSESSMENT:   Response to treatment: patient progressing well, remains compliant with home recommendations, limited at times by CMC arthritis as well as residual wrist pain from  injury    PLAN/RECOMMENDATIONS:   Plan for treatment: therapy treatment to continue next visit.  Planned interventions for next visit: continue with current treatment

## 2024-03-22 DIAGNOSIS — K21.9 GASTROESOPHAGEAL REFLUX DISEASE, UNSPECIFIED WHETHER ESOPHAGITIS PRESENT: ICD-10-CM

## 2024-03-22 RX ORDER — OMEPRAZOLE 40 MG/1
40 CAPSULE, DELAYED RELEASE ORAL DAILY
Qty: 90 CAPSULE | Refills: 1 | Status: SHIPPED | OUTPATIENT
Start: 2024-03-22

## 2024-03-22 NOTE — TELEPHONE ENCOUNTER
Received request via: Pharmacy    Was the patient seen in the last year in this department? Yes    Does the patient have an active prescription (recently filled or refills available) for medication(s) requested? No    Pharmacy Name: [id number]    Does the patient have [facility] and need 100 day supply (blood pressure, diabetes and cholesterol meds only)? Patient does not have SCP    REQUEST FOR 90 DAYS PRESCRIPTION. DX Code Needed.

## 2024-03-26 ENCOUNTER — APPOINTMENT (OUTPATIENT)
Dept: OCCUPATIONAL THERAPY | Facility: REHABILITATION | Age: 64
End: 2024-03-26
Attending: INTERNAL MEDICINE
Payer: MEDICARE

## 2024-03-27 ENCOUNTER — OFFICE VISIT (OUTPATIENT)
Dept: BEHAVIORAL HEALTH | Facility: CLINIC | Age: 64
End: 2024-03-27
Payer: MEDICARE

## 2024-03-27 DIAGNOSIS — F33.1 MAJOR DEPRESSIVE DISORDER, RECURRENT EPISODE, MODERATE (HCC): ICD-10-CM

## 2024-03-27 DIAGNOSIS — F41.1 GENERALIZED ANXIETY DISORDER: ICD-10-CM

## 2024-03-27 PROCEDURE — 99214 OFFICE O/P EST MOD 30 MIN: CPT | Performed by: PSYCHIATRY & NEUROLOGY

## 2024-03-27 RX ORDER — LORAZEPAM 1 MG/1
1 TABLET ORAL DAILY
Qty: 30 TABLET | Refills: 0 | Status: SHIPPED | OUTPATIENT
Start: 2024-03-27 | End: 2024-04-26

## 2024-03-27 RX ORDER — CITALOPRAM HYDROBROMIDE 10 MG/1
10 TABLET ORAL NIGHTLY
Qty: 90 TABLET | Refills: 0 | Status: SHIPPED | OUTPATIENT
Start: 2024-03-27 | End: 2024-06-25

## 2024-03-27 NOTE — PROGRESS NOTES
[facility]   Follow Up Assessment     This provider informed the patient their medical records are totally confidential except for the use by other providers involved in their care, or if the patient signs a release, or to report instances of child or elder abuse, or if it is determined they are an immediate risk to harm themselves or others.    Name: Iveth Daley  MRN: [id number]  : 1960  Age: 63 y.o.  Date of assessment: 3/27/2024  PCP: Abhinav Jones M.D.      Subjective:  Chart reviewed prior to seeing her in my office.  She was last seen on .  She has been seeing a spine specialist and baljit have 3 ablations 9 days from now.  She did gymnastics in high school.  We reviewed her current meds, purposes, doses, etc. and discussed options.  She does like Celexa 10 mg at bedtime better than Zoloft.  It does not appear that she has bipolar disorder.  Depakote ER 1,500 mg nightly will be slowly tapered off but it could be restarted if she starts to experience mood swings.  The next medication that could be discontinued would be BuSpar 5 mg daily.  Propranolol has been decreased to 10 mg twice daily and will continue for now.  Ativan 1 mg daily as needed is helping her anxiety.    Objective:  She is alert, oriented, and cooperative.  Relatedness is good.  Grooming is good.  Speech is normal rate.  Anxious.  Memory is good.  Insight and judgment are good.  No indication of psychotic thinking.    Current Risk:       Suicidal: Not suicidal       Homicidal: Not homicidal       Self-Harm: No plan to harm self       Relapse: (Low/Moderate/High): Moderate       Crisis Safety Plan Reviewed: Discussed with patient    Diagnosis:   Major depressive disorder, recurrent  Generalized anxiety disorder    Treatment Plan:  The current treatment plan consists of monthly psychiatric sessions designed to evaluate her depression and anxiety.    Duration will be for a minimum of 12 months and will be  reviewed at each visit.    Goals: Remission of depression with control of anxiety in order to prevent relapse due to the chronic nature of her behavioral health problems and mental illness.  See present illness for medication changes.    Hugo Cartagena M.D.      This note was created using voice recognition software (Dragon). The accuracy of the dictation is limited by the abilities of the software. I have reviewed the note prior to signing, however some errors in grammar and context are still possible. If you have any questions related to this note please do not hesitate to contact our office.

## 2024-03-28 ENCOUNTER — PHYSICAL THERAPY (OUTPATIENT)
Dept: PHYSICAL THERAPY | Facility: REHABILITATION | Age: 64
End: 2024-03-28
Attending: INTERNAL MEDICINE
Payer: MEDICARE

## 2024-03-28 DIAGNOSIS — G89.29 CHRONIC RIGHT HIP PAIN: ICD-10-CM

## 2024-03-28 DIAGNOSIS — M25.551 CHRONIC RIGHT HIP PAIN: ICD-10-CM

## 2024-03-28 PROCEDURE — 97110 THERAPEUTIC EXERCISES: CPT

## 2024-03-28 NOTE — OP THERAPY DAILY TREATMENT
"Outpatient Physical Therapy  DAILY TREATMENT     [facility] [address]  [address]  [place] [address]  Phone:  [phone]  Fax:  [phone]    Date: 03/28/2024    Patient: Iveth Daley  YOB: 1960  MRN: [id number]     Time Calculation    Start time: 1030  Stop time: 1125 Time Calculation (min): 55 minutes         Chief Complaint: No chief complaint on file.    Visit #: 6    SUBJECTIVE:    She had a problem w/her schedule and hasn't been in since Feb.  Her injections w/Lidocaine were painful in her back.  Side stretching aleviates it. Also heat in her chair helps. She feels weak.   Pain rating (1-10) before treatment: \"excruciating\"  Pain rating (1-10) after treatment: 3-4  Location: R hip posterior Description:  radiates to R glut lateral thigh to knee n. Constant pain  Eases:  sidebending to L; med. Change to help her sleep. aspercream, OTC/Tylenol     OBJECTIVE:  Current objective measures:  Lumbar AROM  Ext to neutral, pain across B SIJ            Therapeutic Exercises (CPT 51077):     1. supine isometric abs w/ball, 14 x ea.    2. dead bug w/ball on stomach, R, L 6    3. lat. lean wall L bend, she is compliant and loves it.    4. Sup HL KFO, 5, 5, VC/TC    5. supine TvA, 10, VC/TC; towel prop low L/S    6. Sustained prone-supported w/pillows, -    7. bridge w/FT on ball, 10x 5\"    8. PKB ( in L s/l), 1x60\", KF to 80 deg.    9. Traction in L s/l on plynth, 3'    Therapeutic Treatments and Modalities:     2. Hot or Cold Pack Therapy (CPT 45155), L/S sup HL x 10'    Therapeutic Treatment and Modalities Summary: Hep: BACK HIP STRETCHES  BACK HIP STRETCHES  [[email or web address]] [email or web address]: [email or web address]    Trunk Side bend - ITB stretch -  Repeat 5 Repetitions, Hold 10 Seconds, Complete 1 Set, Perform 3 Times a Day    Standing Lumbar Extension -  Repeat 5 Repetitions, Hold 10 Seconds, Complete 1 Set, Perform 3 Times a Day    PELVIC SHIFT " STANDING - WALL -  Repeat 5 Repetitions, Hold 10 Seconds, Complete 1 Set, Perform 3 Times a Day    Glut - Piriformis Stretch - Supine Hip flex with adduction  -  Repeat 1 Repetition, Hold 30 Seconds, Complete 3 Sets, Perform 3 Times a Day    HAMSTRING STRETCH WITH MULTI-LOOP STRAP -  Repeat 1 Repetition, Hold 30 Seconds, Complete 3 Sets, Perform 3 Times a Day    Time-based treatments/modalities:  Physical Therapy Timed Treatment Charges  Therapeutic exercise minutes (CPT 27887): 40 minutes    ASSESSMENT:   Short Term Goals:   1. Pt will be indep and compliant with HEP - Met  2. Pt will participate in balance assessment:miniBEST - Not appropriate   Short term goal time span:  2-4 weeks      Long Term Goals:    1. Pt will be able to demo lumbar AROM WFL to improve SLR >45deg, PM; SLR is WNL- see*.   2. Pt will be able to improve core endurance >2min with core stab ex's to allow standing >1 hr - NM  3. *PKB WNL (KF to >90 deg.)  Long term goal time span:  6-8 weeks    Response to treatment: tolerates the beginning spinal stab. Ex f-bias. Traction helped lower pain and ice to baseline. Goals updated.    PLAN/RECOMMENDATIONS:   Plan for treatment: therapy treatment to continue next visit.  Planned interventions for next visit: continue with current treatment.

## 2024-03-29 ENCOUNTER — OFFICE VISIT (OUTPATIENT)
Dept: SLEEP MEDICINE | Facility: MEDICAL CENTER | Age: 64
End: 2024-03-29
Attending: INTERNAL MEDICINE
Payer: MEDICARE

## 2024-03-29 VITALS
HEART RATE: 69 BPM | WEIGHT: 223.2 LBS | BODY MASS INDEX: 39.55 KG/M2 | RESPIRATION RATE: 16 BRPM | DIASTOLIC BLOOD PRESSURE: 80 MMHG | HEIGHT: 63 IN | OXYGEN SATURATION: 97 % | SYSTOLIC BLOOD PRESSURE: 126 MMHG

## 2024-03-29 DIAGNOSIS — G47.8 POOR SLEEP PATTERN: ICD-10-CM

## 2024-03-29 DIAGNOSIS — G47.30 BREATHING-RELATED SLEEP DISORDER: ICD-10-CM

## 2024-03-29 DIAGNOSIS — R06.83 SNORING: ICD-10-CM

## 2024-03-29 PROCEDURE — 99212 OFFICE O/P EST SF 10 MIN: CPT | Performed by: PREVENTIVE MEDICINE

## 2024-03-29 ASSESSMENT — FIBROSIS 4 INDEX: FIB4 SCORE: 1.36

## 2024-03-29 NOTE — PROGRESS NOTES
"CHIEF COMPLIANT: \"Establish care.\"  HISTORY OF PRESENT ILLNESS:  Iveth Daley is a 63 y.o. female kindly referred by Abhinav Jones M.D. and  is here for a sleep medicine consultation.     Sleep History:  Iveth Daley has never been tested for sleep apnea. The patient reports snoring,  anxiety, depression,   sleeps with HOB elevated and chronic fatigue.  The patient goes to bed at 10-11 pm and wakes up at 7-8 am. It usually takes the patient approximately 10 mins to fall asleep. The patient does not feel refreshed after sleeping and does not  nap during the day.   This pt is a former smoker; was a social smoker for two years in her twenties. Pt does use cannabis ( vapes)  .  This pt does drink 1-2 alcoholic beverages per year and has 2 cups coffee/ week.    The patient denies any symptoms of narcolepsy such as sleep paralysis or cataplexy, or any symptoms that suggest parasomnias such as sleep walking or acting out of dreams.    Iveth Daley is a retired .     Endorses family members who use PAP therapy/snore: Parents snored, sister snores    EPWORTH SCORE:  4  /24, this is  NOT  consistent with EDS      Significant comorbidities and modifying factors: see below    PAST MEDICAL HISTORY:  Past Medical History:   Diagnosis Date    Anxiety     Back pain     Back pain     Bronchitis     Constipation     Depression     Diverticulitis of ascending colon 2021    abscess in the colon    Fatigue     GERD (gastroesophageal reflux disease)     Hearing difficulty     Obesity     Painful joint     Pneumonia     Weakness     Wears glasses     Wrist fracture 05/10/2023    Right wrist broken from a fall      PROBLEM LIST:  Patient Active Problem List    Diagnosis Date Noted    Major depressive disorder, recurrent episode, moderate (HCC) 03/05/2024    Generalized anxiety disorder 03/05/2024    Mild mood disorder (HCC) 03/05/2024    Nausea 02/06/2024    Constipation " 2024    Administrative encounter 2024    Gastroesophageal reflux disease without esophagitis 2024    Abdominal bloating 2024    Stomach problems 2023    At risk for sleep apnea 2023    Depression 2023    Mood swings 2023    Fine tremor 2023    Closed fracture of distal end of right radius with malunion 2023    Chronic right hip pain 2023    Acute non-recurrent maxillary sinusitis 2023     PAST SOCIAL HISTORY:  Past Surgical History:   Procedure Laterality Date    CARPAL TUNNEL RELEASE      CHOLECYSTECTOMY          COLON RESECTION       PAST FAMILY HISTORY:  Family History   Problem Relation Age of Onset    Stroke Mother     Parkinson's Disease Father     Cancer Father         Parkinson’s disease    Cancer Sister         MM    No Known Problems Brother     Ovarian Cancer Neg Hx     Tubal Cancer Neg Hx     Peritoneal Cancer Neg Hx     Colorectal Cancer Neg Hx     Breast Cancer Neg Hx     Bilateral Breast Cancer Neg Hx     BRCA 1 Neg Hx     BRCA 1 Carrier  Neg Hx     BRCA 2 Neg Hx     BRCA 2 Carrier Neg Hx      SOCIAL HISTORY:  Social History     Socioeconomic History    Marital status: Single     Spouse name: Not on file    Number of children: Not on file    Years of education: Not on file    Highest education level: Associate degree: academic program   Occupational History    Not on file   Tobacco Use    Smoking status: Former     Current packs/day: 0.00     Types: Cigarettes     Start date: 1979     Quit date: 1981     Years since quittin.8     Passive exposure: Yes    Smokeless tobacco: Never    Tobacco comments:     Smoked in my 20’s socially with meals or cocktails.   Vaping Use    Vaping Use: Some days    Substances: THC   Substance and Sexual Activity    Alcohol use: Yes     Alcohol/week: 0.6 oz     Types: 1 Glasses of wine per week     Comment: On occasion wine with dinner    Drug use: Not Currently     Frequency: 2.0  times per week     Types: Marijuana    Sexual activity: Not Currently     Partners: Female     Birth control/protection: Condom, Pill   Other Topics Concern    Not on file   Social History Narrative    Not on file     Social Determinants of Health     Financial Resource Strain: Low Risk  (12/7/2023)    Overall Financial Resource Strain (CARDIA)     Difficulty of Paying Living Expenses: Not very hard   Food Insecurity: No Food Insecurity (12/7/2023)    Hunger Vital Sign     Worried About Running Out of Food in the Last Year: Never true     Ran Out of Food in the Last Year: Never true   Transportation Needs: No Transportation Needs (12/7/2023)    PRAPARE - Transportation     Lack of Transportation (Medical): No     Lack of Transportation (Non-Medical): No   Physical Activity: Insufficiently Active (12/7/2023)    Exercise Vital Sign     Days of Exercise per Week: 2 days     Minutes of Exercise per Session: 20 min   Stress: Stress Concern Present (12/7/2023)    [place] [employer] - Occupational Stress Questionnaire     Feeling of Stress : Rather much   Social Connections: Socially Isolated (12/7/2023)    Social Connection and Isolation Panel [NHANES]     Frequency of Communication with Friends and Family: More than three times a week     Frequency of Social Gatherings with Friends and Family: Once a week     Attends Orthodoxy Services: Never     Active Member of Clubs or Organizations: No     Attends Club or Organization Meetings: Never     Marital Status: Never    Intimate Partner Violence: Not on file   Housing Stability: Low Risk  (12/7/2023)    Housing Stability Vital Sign     Unable to Pay for Housing in the Last Year: No     Number of Places Lived in the Last Year: 1     Unstable Housing in the Last Year: No     ALLERGIES: Codeine and Sulfa drugs  MEDICATIONS:  Current Outpatient Medications   Medication Sig Dispense Refill    citalopram (CELEXA) 10 MG tablet Take 1 Tablet by mouth  "every evening for 90 days. 90 Tablet 0    LORazepam (ATIVAN) 1 MG Tab Take 1 Tablet by mouth every day at 6 PM for 30 days. 30 Tablet 0    omeprazole (PRILOSEC) 40 MG delayed-release capsule TAKE 1 CAPSULE BY MOUTH EVERY DAY 90 Capsule 1    busPIRone (BUSPAR) 5 MG tablet Take 1 Tablet by mouth every day for 90 days. 90 Tablet 0    citalopram (CELEXA) 10 MG tablet Take 1 Tablet by mouth every evening for 30 days. 30 Tablet 0    LORazepam (ATIVAN) 1 MG Tab Take 1 Tablet by mouth every day at 6 PM for 30 days. 30 Tablet 0    propranolol (INDERAL) 10 MG Tab Take 1 Tablet by mouth 3 times a day. 270 Tablet 3    polyethylene glycol/lytes (MIRALAX) Pack Take 17 g by mouth every day.      Wheat Dextrin (BENEFIBER PO) Take  by mouth.      acetaminophen (TYLENOL) 325 MG Tab 325 mg.      DEPAKOTE  MG TABLET SR 24 HR Take 6 Tablets by mouth every day. 30 Tablet      No current facility-administered medications for this visit.    \"CURRENT RX\"    REVIEW OF SYSTEMS:  Constitutional: Denies weight loss, endorses chronic daytime fatigue --also see HPI    PHYSICAL EXAM/VITALS:  /80 (BP Location: Left arm, Patient Position: Sitting, BP Cuff Size: Adult)   Pulse 69   Resp 16   Ht 1.6 m (5' 3\")   Wt 101 kg (223 lb 3.2 oz)   SpO2 97%   BMI 39.54 kg/m²   Neck circumference (inches): 16  Appearance: Well-nourished, well-developed,  looks stated age, no acute distress  Eyes:   EOMI  ENMT:   Hard palate narrow: No   Hard palate high: No   Soft palate/uvula (Mallampati score): 4  Tongue Scalloping: No   Retrognathia:  No   Micrognathia:  No    Neck: Supple, trachea midline  Respiratory effort:  No intercostal retractions or use of accessory muscles  Lung auscultation:  No wheezes rhonchi rubs or rales  Cardiac: No murmurs, rubs, or gallops; regular rhythm, normal rate; no edema  Musculoskeletal:  Grossly normal; gait and station normal  Neurologic:  oriented to person, time, place, and purpose; judgement intact  Psychiatric: "  No depression, anxiety, agitation    MEDICAL DECISION MAKING:  The medical record was reviewed as it pertains to this referral. This includes records from primary care,consultants notes, referral request, hospital records, labs and imaging. Any available diagnostic and titration nocturnal polysomnograms, home sleep apnea tests, continuous nocturnal oximetry results, multiple sleep latency tests, and recent compliance reports were reviewed with the patient.    ASSESSMENT/PLAN:  Iveth Daley is a 63 y.o. female  who has a high pretest probability of having obstructive sleep apnea.  HST's often underestimate sleep apnea in women. A negative HST should be followed by a laboratory sleep study (polysomnography) if the clinical suspicion for sleep apnea is high. In women, the common co-occurrence of insomnia and KARYN is frequently reported.    DIAGNOSES :    1. Breathing-related sleep disorder  - Overnight Home Sleep Study; Future    2. Poor sleep pattern  - Overnight Home Sleep Study; Future    3. Snoring  - Overnight Home Sleep Study; Future    The patient has signs and symptoms consistent with obstructive sleep apnea hypopnea syndrome. Will schedule a nocturnal polysomnogram or a home sleep apnea test (please see plan).  The risks of untreated sleep apnea were discussed with the patient at length. Patients with KARYN are at increased risk of cardiovascular disease including coronary artery disease, systemic arterial hypertension, pulmonary arterial hypertension, cardiac arrhythmias, and stroke. KARYN patients have an increased risk of motor vehicle accidents, type 2 diabetes, chronic kidney disease, and non-alcoholic liver disease. The patient was advised to avoid driving a motor vehicle when drowsy.  Have advised the patient to follow up with the appropriate healthcare practitioners for all other medical problems and issues.    RETURN TO CLINIC: Return for 3 weeks after SS - discuss results w/ any provider   or  first availble  with Dr Rojas.    My total time spent caring for the patient on the day of the encounter was 40 minutes. This includes time spent on a thorough chart review including other physician notes, all sleep studies, as well as critical labs and pulmonary and cardiac studies.  Additionally, it includes a thorough discussion of good sleep hygiene and stimulus control, as well as  the need for consistency in terms of sleep preparation and practice.    Please note that this dictation was created using voice recognition software.  I have made every reasonable attempt to correct obvious errors, I expect that there are errors of grammar and possibly content that I did not discover before finalizing this note.                        Answers submitted by the patient for this visit:  Sleep Center Questionnaire (Submitted on 3/26/2024)  Year of your last physical exam: 2022  Occupation :   Height: 5’3.5”  Current weight: 215  6 months ago: 220  At age 18: 130  What is the reason for your visit today?: Snoring im told by family  Name of person referring you to the Sleep Center: Dr. Sebastina Jones  Have you ever been hospitalized?: Yes  Reason, year, and hospital in which you were hospitalized:: 2021, diverticulitis, septic, perforated: removed 10 inches of my colon, my gallbladder,. At [facility] in [facility].  Have you ever had problems with anesthesia?: No  Have you experienced post-operative delirium?: No  Any complications with surgery?: No  What [facility] you [facility] your [facility]  What year did you receive you last Pneumonia shot?: 2024-[facility]  Have you had a TB skin test? 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  Have you had Allergy skin testing? If so, please list the year and result:: No  Please briefly describe your sleep problem and how old you were when it began.: I  apparently snore, for the last five years, again, according to family. I wake up a few times a night 2 to 3 more for bathroom breaks. I sleep better when my weight is down and my congestion is down. I do have seasonal allergies. Wake up sluggish  How does this affect your daily life and activities? Please also rate how serious of a problem this is (1 = Not at all, 10 = Very Serious).: I wake up sluggish at times. Rate rating is about a four.  Have you had any previous evaluations, examinations, or treatment for this sleep problem or any other problems with your sleep? If so, please describe the evaluation, treatment, and results.: I’ve never had any treatment.  Have you used any medications (prescribed or otherwise) to help your sleep problem? If yes, include name, amount, frequency, and the prescribing physician.: I take Tylenol extra strength at night to assist with my pain in my spine, which disrupts my sleep. Only when I’m ill with respiratory cold, I will take some NyQuil on rare occasion.  Do you have a regular bed partner?: No  How many minutes does it usually take to fall asleep at night after turning off the lights?: Depends a lot on stress, currently maybe 10 minutes or less  What do you ordinarily do just prior to turning out the lights and attempting to go to sleep (e.g., reading, TV, baths, etc.)?: Reading, guilty of tv, try to have have electronics off 30 minutes before bedtime. Tidy up the house and get into a bed  On average, how many times do you wake up during the night?: 2-3  On average, how many times do you wake up to use the bathroom?: 2-3  Do you often wake up too early in the morning and are unable to return to sleep?: Rarely, I have a good dark drapes in my bedroom  On average, how many hours of sleep do you get per night?: 6  How do you usually awaken?  Alarm, spontaneously, or other?: Wake up to light, or a sophia reminding me they’re hungry.  Is it difficult for you to awaken and get out  "of bed after sleeping? (Not at all, Sometimes, Very): Sometimes, if I haven’t slept well  Do you nap or return to bed after arising?: Rarely. If I’ve had a busy day, and I’m in a lot of pain which can create fatigue. I may lay down for a nap.  Are you bothered by sleepiness during the day?: I may be by bothered by fatigue or tiredness, but I wouldn’t say sleepiness  Do you feel that you get too much sleep at night?: No  Do you feel that you get too little sleep at night?: Yes  Do you usually feel tired during the day? If so, what do you attribute this to?: Yes. This is attributed to lack of sleep, spine, pain, excess weight. Which I am working on.  Do you find yourself falling asleep when you don't mean to? : No  Have you ever suddenly fallen?: No  Have you ever experienced sudden body weakness?: No  Have you ever experienced weakness or paralysis upon going to sleep?: No  Have you ever experienced weakness or paralysis upon awakening from sleep?: No  Have you ever experienced seeing things or hearing voices/noises: That weren't real? On going to sleep? During the night? On awakening from sleep? During the day?: No  Do you have difficulty breathing at night? If yes, briefly describe.: No  Have you been told you snore while asleep? If so, does it disturb a bed partner (or someone in the same room), or someone in the next room?: Yes, disturbed bed partner  Have you ever experienced doing something without being aware of the action? If yes, please describe.: No  Have you ever experienced upon lying in bed before sleep or on awakening from sleep: Restlessness of legs, \"nervous legs\", \"creeping crawling\" sensation of legs, or twitching of legs?: No  Do you know, or have you ever been told that you do any of the following while sleeping: talk, walk, grit teeth, wet the bed, wake up screaming or seemingly afraid, have disturbing dreams, have unusual movements, wake up with headaches, (males) have erections? If yes to any " of these, please indicate how many times per week, age started, last occurrence, treatment received.: I got a teeth guard 2022, sometimes have bad dreams, rare occasion to wake up with a headache usually when it’s a dry time of year  Has anyone in your family been known to have any sleep problems? If yes, please list the type of problem (e.g., trouble getting to sleep, too sleepy, bed wetting, etc.), the relationship of this person to you, and the treatment received.: No

## 2024-04-08 ENCOUNTER — PATIENT MESSAGE (OUTPATIENT)
Dept: SLEEP MEDICINE | Facility: MEDICAL CENTER | Age: 64
End: 2024-04-08
Payer: MEDICARE

## 2024-04-09 ENCOUNTER — OFFICE VISIT (OUTPATIENT)
Dept: MEDICAL GROUP | Facility: IMAGING CENTER | Age: 64
End: 2024-04-09
Payer: MEDICARE

## 2024-04-09 VITALS
DIASTOLIC BLOOD PRESSURE: 76 MMHG | HEIGHT: 63 IN | WEIGHT: 225 LBS | TEMPERATURE: 97 F | OXYGEN SATURATION: 96 % | BODY MASS INDEX: 39.87 KG/M2 | HEART RATE: 75 BPM | SYSTOLIC BLOOD PRESSURE: 118 MMHG

## 2024-04-09 DIAGNOSIS — Z78.0 MENOPAUSE: ICD-10-CM

## 2024-04-09 DIAGNOSIS — R14.0 ABDOMINAL BLOATING: ICD-10-CM

## 2024-04-09 DIAGNOSIS — K21.9 GASTROESOPHAGEAL REFLUX DISEASE WITHOUT ESOPHAGITIS: ICD-10-CM

## 2024-04-09 DIAGNOSIS — M79.675 TOE PAIN, BILATERAL: ICD-10-CM

## 2024-04-09 DIAGNOSIS — M79.674 TOE PAIN, BILATERAL: ICD-10-CM

## 2024-04-09 DIAGNOSIS — Z12.4 CERVICAL CANCER SCREENING: ICD-10-CM

## 2024-04-09 PROCEDURE — 99214 OFFICE O/P EST MOD 30 MIN: CPT | Performed by: INTERNAL MEDICINE

## 2024-04-09 PROCEDURE — 3074F SYST BP LT 130 MM HG: CPT | Performed by: INTERNAL MEDICINE

## 2024-04-09 PROCEDURE — 3078F DIAST BP <80 MM HG: CPT | Performed by: INTERNAL MEDICINE

## 2024-04-09 RX ORDER — OMEPRAZOLE 20 MG/1
20 CAPSULE, DELAYED RELEASE ORAL
Qty: 90 CAPSULE | Refills: 1 | Status: SHIPPED | OUTPATIENT
Start: 2024-04-09

## 2024-04-09 RX ORDER — FAMOTIDINE 20 MG/1
20 TABLET, FILM COATED ORAL NIGHTLY
Qty: 90 TABLET | Refills: 1 | Status: SHIPPED | OUTPATIENT
Start: 2024-04-09 | End: 2024-04-29

## 2024-04-09 ASSESSMENT — FIBROSIS 4 INDEX: FIB4 SCORE: 1.36

## 2024-04-09 NOTE — PROGRESS NOTES
Established Patient    Iveth Daley is a 63 y.o. female who presents today with the following:    CC:   Chief Complaint   Patient presents with    Follow-Up       HPI:         Problem   Toe Pain, Bilateral    Left big toe pain  (she was walking too fast and accidentally kicked heavy metal table leg) in Feb 2024 and right 5th toe pain for since Nov 2023.     Referral to podiatry     Gastroesophageal Reflux Disease Without Esophagitis    Heartburn is controlled  Taper omeprazole to 20 mg QAM before breakfast  Famotidine 20 mg nightly   Still has intermittent upper abdominal bloating, Gas X as needed for excessive gas.   Pending GI appointment     Abdominal Bloating    Her abdominal bloating and gas has been controlled with omeprazole, Pepcid, and Gas X prn. Pending GI appointment          Current Outpatient Medications   Medication Sig    famotidine (PEPCID) 20 MG Tab Take 1 Tablet by mouth every evening.    omeprazole (PRILOSEC) 20 MG delayed-release capsule Take 1 Capsule by mouth every morning before breakfast.    citalopram (CELEXA) 10 MG tablet Take 1 Tablet by mouth every evening for 90 days.    LORazepam (ATIVAN) 1 MG Tab Take 1 Tablet by mouth every day at 6 PM for 30 days.    busPIRone (BUSPAR) 5 MG tablet Take 1 Tablet by mouth every day for 90 days.    propranolol (INDERAL) 10 MG Tab Take 1 Tablet by mouth 3 times a day.    polyethylene glycol/lytes (MIRALAX) Pack Take 17 g by mouth every day.    Wheat Dextrin (BENEFIBER PO) Take  by mouth.    acetaminophen (TYLENOL) 325 MG Tab 325 mg.    DEPAKOTE  MG TABLET SR 24 HR Take 6 Tablets by mouth every day.     Allergies   Allergen Reactions    Codeine Shortness of Breath    Sulfa Drugs Rash       Allergies, past medical history, past surgical history, medications, family history, social history reviewed and updated.    ROS Please see HPI    Physical Exam  Vitals: /76 (BP Location: Right arm, Patient Position: Sitting, BP Cuff Size: Adult  "long)   Pulse 75   Temp 36.1 °C (97 °F) (Temporal)   Ht 1.6 m (5' 3\")   Wt 102 kg (225 lb)   SpO2 96%   BMI 39.86 kg/m²   Physical Exam  Constitutional:       Appearance: Normal appearance.   HENT:      Head: Normocephalic and atraumatic.      Right Ear: External ear normal.      Left Ear: External ear normal.      Nose: Nose normal.      Mouth/Throat:      Pharynx: Oropharynx is clear.   Cardiovascular:      Rate and Rhythm: Normal rate and regular rhythm.      Pulses: Normal pulses.   Pulmonary:      Effort: Pulmonary effort is normal.      Breath sounds: Normal breath sounds.   Abdominal:      General: Bowel sounds are normal.      Palpations: Abdomen is soft.      Tenderness: There is no abdominal tenderness.   Musculoskeletal:      Cervical back: Neck supple.      Right lower leg: No edema.      Left lower leg: No edema.   Skin:     General: Skin is warm and dry.      Comments: Discoloration of left big toe   Neurological:      Mental Status: She is alert. Mental status is at baseline.   Psychiatric:         Mood and Affect: Mood normal.         Behavior: Behavior normal.         Thought Content: Thought content normal.         Judgment: Judgment normal.         Labs (3/19/24) were reviewed and discussed with patients.  All questions were answered.      Assessment and Plan    1. Toe pain, bilateral  - Referral to Podiatry    2. Abdominal bloating  Her abdominal bloating and gas has been controlled with omeprazole, Pepcid, and Gas X prn. Pending GI appointment    3. Gastroesophageal reflux disease without esophagitis  - famotidine (PEPCID) 20 MG Tab; Take 1 Tablet by mouth every evening.  Dispense: 90 Tablet; Refill: 1  - omeprazole (PRILOSEC) 20 MG delayed-release capsule; Take 1 Capsule by mouth every morning before breakfast.  Dispense: 90 Capsule; Refill: 1  Heartburn is controlled  Taper omeprazole to 20 mg QAM before breakfast  Famotidine 20 mg nightly   Still has intermittent upper abdominal " bloating, Gas X as needed for excessive gas.   Pending GI appointment  Healthful lifestyle measures    4. Menopause  - DS-BONE DENSITY STUDY (DEXA); Future    5. Cervical cancer screening  - Referral to Gynecology    Obtain colonoscopy report from [facility] in [place], [place]      Follow-up:Return if symptoms worsen or fail to improve.    This note was created using voice recognition software. There may be unintended errors in spelling, grammar or content.

## 2024-04-09 NOTE — ASSESSMENT & PLAN NOTE
Heartburn is controlled  Taper omeprazole to 20 mg QAM before breakfast  Famotidine 20 mg nightly   Still has intermittent upper abdominal bloating, Gas X as needed for excessive gas.   Pending GI appointment

## 2024-04-09 NOTE — ASSESSMENT & PLAN NOTE
Left big toe pain  (she was walking too fast and accidentally kicked heavy metal table leg) in Feb 2024 and right 5th toe pain for since Nov 2023.     Referral to podiatry

## 2024-04-09 NOTE — LETTER
Jammit  Abhinav Jones M.D.  [address]   [place] [address]  Fax: [phone]   Authorization for Release/Disclosure of   Protected Health Information   Name: SUZAN GARLAND : 1960 SSN: [id number]   Address: [facility] [address] [place] [address] Phone:    There are no phone numbers on file.   I authorize the entity listed below to release/disclose the PHI below to:   Jammit/Abhinav Jones M.D. and Abhinav Jones M.D.   Provider or Entity Name:  [facility] [place] [place]   Address   City, State, [facility]   Phone:      Fax:[phone]   Reason for request: continuity of care   Information to be released:    [ x ] LAST COLONOSCOPY,  including any PATH REPORT and follow-up  [  ] LAST FIT/COLOGUARD RESULT [  ] LAST DEXA  [  ] LAST MAMMOGRAM  [  ] LAST PAP  [  ] LAST LABS [  ] RETINA EXAM REPORT  [  ] IMMUNIZATION RECORDS  [  ] Release all info      [  ] Check here and initial the line next to each item to release ALL health information INCLUDING  _____ Care and treatment for drug and / or alcohol abuse  _____ HIV testing, infection status, or AIDS  _____ Genetic Testing    DATES OF SERVICE OR TIME PERIOD TO BE DISCLOSED: _____________  I understand and acknowledge that:  * This Authorization may be revoked at any time by you in writing, except if your health information has already been used or disclosed.  * Your health information that will be used or disclosed as a result of you signing this authorization could be re-disclosed by the recipient. If this occurs, your re-disclosed health information may no longer be protected by State or Federal laws.  * You may refuse to sign this Authorization. Your refusal will not affect your ability to obtain treatment.  * This Authorization becomes effective upon signing and will  on (date) __________.      If no date is indicated, this Authorization will  one (1) year from the signature date.    Name: Suzan  Sagrario Daley    Signature:   Date:     4/9/2024       PLEASE FAX REQUESTED RECORDS BACK TO: [phone]

## 2024-04-09 NOTE — ASSESSMENT & PLAN NOTE
Her abdominal bloating and gas has been controlled with omeprazole, Pepcid, and Gas X prn. Pending GI appointment

## 2024-04-16 ENCOUNTER — HOSPITAL ENCOUNTER (OUTPATIENT)
Dept: RADIOLOGY | Facility: MEDICAL CENTER | Age: 64
End: 2024-04-16
Attending: INTERNAL MEDICINE
Payer: MEDICARE

## 2024-04-16 DIAGNOSIS — Z78.0 MENOPAUSE: ICD-10-CM

## 2024-04-16 PROCEDURE — 77080 DXA BONE DENSITY AXIAL: CPT

## 2024-04-28 DIAGNOSIS — K21.9 GASTROESOPHAGEAL REFLUX DISEASE WITHOUT ESOPHAGITIS: ICD-10-CM

## 2024-04-29 RX ORDER — FAMOTIDINE 20 MG/1
20 TABLET, FILM COATED ORAL NIGHTLY
Qty: 90 TABLET | Refills: 3 | Status: SHIPPED | OUTPATIENT
Start: 2024-04-29

## 2024-04-29 NOTE — TELEPHONE ENCOUNTER
Received request via: Patient    Was the patient seen in the last year in this department? Yes    Does the patient have an active prescription (recently filled or refills available) for medication(s) requested? No    Pharmacy Name: [facility]    Does the patient have residential Plus and need 100 day supply (blood pressure, diabetes and cholesterol meds only)? Patient does not have SCP

## 2024-04-30 ENCOUNTER — OFFICE VISIT (OUTPATIENT)
Dept: BEHAVIORAL HEALTH | Facility: CLINIC | Age: 64
End: 2024-04-30
Payer: MEDICARE

## 2024-04-30 DIAGNOSIS — F33.1 MAJOR DEPRESSIVE DISORDER, RECURRENT EPISODE, MODERATE (HCC): ICD-10-CM

## 2024-04-30 DIAGNOSIS — F41.1 GENERALIZED ANXIETY DISORDER: ICD-10-CM

## 2024-04-30 DIAGNOSIS — G25.2 FINE TREMOR: ICD-10-CM

## 2024-04-30 RX ORDER — PROPRANOLOL HYDROCHLORIDE 10 MG/1
10 TABLET ORAL 2 TIMES DAILY
Qty: 180 TABLET | Refills: 0 | Status: SHIPPED | OUTPATIENT
Start: 2024-04-30 | End: 2024-07-29

## 2024-04-30 RX ORDER — LORAZEPAM 0.5 MG/1
0.5 TABLET ORAL DAILY
Qty: 30 TABLET | Refills: 0 | Status: SHIPPED | OUTPATIENT
Start: 2024-04-30 | End: 2024-05-30

## 2024-04-30 RX ORDER — CITALOPRAM HYDROBROMIDE 10 MG/1
10 TABLET ORAL NIGHTLY
Qty: 90 TABLET | Refills: 0 | Status: SHIPPED | OUTPATIENT
Start: 2024-04-30 | End: 2024-07-29

## 2024-04-30 NOTE — PROGRESS NOTES
[facility]   Follow Up Assessment     This provider informed the patient their medical records are totally confidential except for the use by other providers involved in their care, or if the patient signs a release, or to report instances of child or elder abuse, or if it is determined they are an immediate risk to harm themselves or others.    Name: Iveth Daley  MRN: [id number]  : 1960  Age: 63 y.o.  Date of assessment: 2024  PCP: Abhinav Jones M.D.      Subjective:  Chart reviewed prior to seeing her in my office.  She was seen most recently on .  She has nearly completed tapering off of Depakote and has discontinued BuSpar 5 mg daily.  She does want to continue propranolol 10 mg twice daily.  Celexa 10 mg at bedtime has worked well for her depression and anxiety.  She would also like to decrease Ativan to 0.5 mg as needed.   She will be tested for sleep apnea soon.  She did gymnastics and played tennis in high school and did skydiving in early adulthood.    Objective:  She is alert, oriented, and cooperative.  Relatedness is good.  Talkative.  Grooming is good.  Speech is a little rapid.  Memory is good.  Insight and judgment are good.  No indication of psychotic thinking.    Current Risk:       Suicidal: Not suicidal       Homicidal: Not homicidal       Self-Harm: No plan to harm self       Relapse: (Low/Moderate/High): Moderate       Crisis Safety Plan Reviewed: Discussed with patient    Diagnosis:   Major depressive disorder, recurrent  Generalized anxiety disorder    Treatment Plan:  The current treatment plan consists of quarterly psychiatric sessions designed to evaluate her depression and anxiety.    Duration will be for a minimum of 12 months and will be reviewed at each visit.    Goals: Remission of depression with control of anxiety in order to prevent relapse due to the chronic nature of her behavioral health problems.  She is in the process of  tapering off of Depakote.  BuSpar has been discontinued.  Continue propranolol 10 mg twice daily.  Continue Celexa 10 mg at bedtime.  Decrease Ativan to 0.5 mg daily as needed    Hugo Cartagena M.D.      This note was created using voice recognition software (Dragon). The accuracy of the dictation is limited by the abilities of the software. I have reviewed the note prior to signing, however some errors in grammar and context are still possible. If you have any questions related to this note please do not hesitate to contact our office.

## 2024-05-15 ENCOUNTER — HOME STUDY (OUTPATIENT)
Dept: SLEEP MEDICINE | Facility: MEDICAL CENTER | Age: 64
End: 2024-05-15
Attending: PREVENTIVE MEDICINE
Payer: MEDICARE

## 2024-05-15 DIAGNOSIS — G47.8 POOR SLEEP PATTERN: ICD-10-CM

## 2024-05-15 DIAGNOSIS — R06.83 SNORING: ICD-10-CM

## 2024-05-15 DIAGNOSIS — G47.30 BREATHING-RELATED SLEEP DISORDER: ICD-10-CM

## 2024-05-23 ENCOUNTER — OFFICE VISIT (OUTPATIENT)
Dept: MEDICAL GROUP | Facility: IMAGING CENTER | Age: 64
End: 2024-05-23
Payer: MEDICARE

## 2024-05-23 ENCOUNTER — HOSPITAL ENCOUNTER (OUTPATIENT)
Facility: MEDICAL CENTER | Age: 64
End: 2024-05-23
Attending: INTERNAL MEDICINE
Payer: MEDICARE

## 2024-05-23 VITALS
WEIGHT: 225.2 LBS | RESPIRATION RATE: 16 BRPM | DIASTOLIC BLOOD PRESSURE: 68 MMHG | TEMPERATURE: 97.8 F | SYSTOLIC BLOOD PRESSURE: 116 MMHG | BODY MASS INDEX: 38.45 KG/M2 | HEART RATE: 75 BPM | HEIGHT: 64 IN | OXYGEN SATURATION: 94 %

## 2024-05-23 DIAGNOSIS — Z51.81 MEDICATION MONITORING ENCOUNTER: ICD-10-CM

## 2024-05-23 DIAGNOSIS — M85.89 OSTEOPENIA OF MULTIPLE SITES: ICD-10-CM

## 2024-05-23 DIAGNOSIS — B02.9 HERPES ZOSTER WITHOUT COMPLICATION: ICD-10-CM

## 2024-05-23 PROCEDURE — 99214 OFFICE O/P EST MOD 30 MIN: CPT | Performed by: INTERNAL MEDICINE

## 2024-05-23 PROCEDURE — 3078F DIAST BP <80 MM HG: CPT | Performed by: INTERNAL MEDICINE

## 2024-05-23 PROCEDURE — 3074F SYST BP LT 130 MM HG: CPT | Performed by: INTERNAL MEDICINE

## 2024-05-23 RX ORDER — GABAPENTIN 100 MG/1
100 CAPSULE ORAL 3 TIMES DAILY
Qty: 90 CAPSULE | Refills: 1 | Status: SHIPPED | OUTPATIENT
Start: 2024-05-23

## 2024-05-23 RX ORDER — VALACYCLOVIR HYDROCHLORIDE 1 G/1
1000 TABLET, FILM COATED ORAL 3 TIMES DAILY
Qty: 21 TABLET | Refills: 0 | Status: SHIPPED | OUTPATIENT
Start: 2024-05-23 | End: 2024-05-30

## 2024-05-23 RX ORDER — HYDROCODONE BITARTRATE AND ACETAMINOPHEN 5; 325 MG/1; MG/1
1 TABLET ORAL EVERY 8 HOURS PRN
Qty: 30 TABLET | Refills: 0 | Status: SHIPPED | OUTPATIENT
Start: 2024-05-23 | End: 2024-06-02

## 2024-05-23 ASSESSMENT — FIBROSIS 4 INDEX: FIB4 SCORE: 1.36

## 2024-05-23 ASSESSMENT — PATIENT HEALTH QUESTIONNAIRE - PHQ9
SUM OF ALL RESPONSES TO PHQ QUESTIONS 1-9: 0
3. TROUBLE FALLING OR STAYING ASLEEP OR SLEEPING TOO MUCH: NOT AT ALL
9. THOUGHTS THAT YOU WOULD BE BETTER OFF DEAD, OR OF HURTING YOURSELF: NOT AT ALL
6. FEELING BAD ABOUT YOURSELF - OR THAT YOU ARE A FAILURE OR HAVE LET YOURSELF OR YOUR FAMILY DOWN: NOT AL ALL
1. LITTLE INTEREST OR PLEASURE IN DOING THINGS: NOT AT ALL
5. POOR APPETITE OR OVEREATING: NOT AT ALL
7. TROUBLE CONCENTRATING ON THINGS, SUCH AS READING THE NEWSPAPER OR WATCHING TELEVISION: NOT AT ALL
SUM OF ALL RESPONSES TO PHQ9 QUESTIONS 1 AND 2: 0
4. FEELING TIRED OR HAVING LITTLE ENERGY: NOT AT ALL
8. MOVING OR SPEAKING SO SLOWLY THAT OTHER PEOPLE COULD HAVE NOTICED. OR THE OPPOSITE, BEING SO FIGETY OR RESTLESS THAT YOU HAVE BEEN MOVING AROUND A LOT MORE THAN USUAL: NOT AT ALL
2. FEELING DOWN, DEPRESSED, IRRITABLE, OR HOPELESS: NOT AT ALL

## 2024-05-23 NOTE — PROGRESS NOTES
Established Patient    Iveth Daley is a 63 y.o. female who presents today with the following:    CC:   Chief Complaint   Patient presents with    Follow-Up     Dexa results  DMV paperwork  Skin bump red, x 1 week       HPI:     Verbal consent was acquired by the patient to use [employer] ambient listening note generation during this visit Yes       History of Present Illness  The patient presents for evaluation of multiple medical concerns.    The patient has been dealing with a rash for the past 5 to 6 days, initially small but has since increased in size. She has not yet received the shingles vaccine. She sustained a fall last week, resulting in severe pain in her chest, which subsequently improved. Following a spinal ablation procedure on Tuesday, she spent 45 minutes lying on her chest. The entire area from the fall is extremely painful and itchy, and she has been applying Neosporin and cortisone to the area. The fall also impacted her elbow. She describes the pain as being punched in the chest. Gabapentin has not been effective in managing her pain, and she has tried tramadol in the past, but it induces sleepiness. Her pain has been improving since her first spinal ablation on the right side She maintains a high water intake and takes vitamins. Her current pain is rated as 8 out of 10. She takes Ativan at night, prescribed by Dr. Acuña, with a reduced dosage to 0.5 mg, but she does not always take it. She has discontinued Depakote. She was prescribed diazepam for a procedure yesterday. She uses a brace, cane, and crutch for mobility. She wears sunscreen daily.    The patient has recently started taking calcium with glucosamine and chondroitin. She has been gaining weight since discontinuing Depakote, despite maintaining a healthy diet and regular exercise. She is inquiring about Ozempic. She does not have diabetes. Her blood sugar levels are consistently good. She underwent a sleep study last  Wednesday, and her pulmonologist informed her that disrupted sleep could affect her metabolism at night.    Problem   Osteopenia of Multiple Sites    4/16/24  According to the World Health Organization classification, bone mineral density of this patient is normal in the lumbar spine, osteopenic in the proximal left femur and osteopenic in the distal left forearm.     10-year Probability of Fracture:  Major Osteoporotic     19.9%  Hip     4.3%  Population      [place] ()     Based on left femur neck BMD    calcium 1200 mg daily from all sources, vitamin D supplement    Consider fosamax     Herpes Zoster Without Complication    Painful blisters on left breast and left shoulder  Valtrex 1g TID for 7 days  Gabapentin and Norco for pain control          Current Outpatient Medications   Medication Sig    valacyclovir (VALTREX) 1 GM Tab Take 1 Tablet by mouth 3 times a day for 7 days.    HYDROcodone-acetaminophen (NORCO) 5-325 MG Tab per tablet Take 1 Tablet by mouth every 8 hours as needed (severe pain) for up to 10 days.    gabapentin (NEURONTIN) 100 MG Cap Take 1 Capsule by mouth 3 times a day.    citalopram (CELEXA) 10 MG tablet Take 1 Tablet by mouth every evening for 90 days.    propranolol (INDERAL) 10 MG Tab Take 1 Tablet by mouth 2 times a day for 90 days.    LORazepam (ATIVAN) 0.5 MG Tab Take 1 Tablet by mouth every day at 6 PM for 30 days.    famotidine (PEPCID) 20 MG Tab Take 1 Tablet by mouth every evening.    omeprazole (PRILOSEC) 20 MG delayed-release capsule Take 1 Capsule by mouth every morning before breakfast.    polyethylene glycol/lytes (MIRALAX) Pack Take 17 g by mouth every day.    Wheat Dextrin (BENEFIBER PO) Take  by mouth.     Allergies   Allergen Reactions    Codeine Shortness of Breath    Sulfa Drugs Rash       Allergies, past medical history, past surgical history, medications, family history, social history reviewed and updated.    ROS Please see HPI    Physical Exam  Vitals: /68  "(BP Location: Left arm, Patient Position: Sitting, BP Cuff Size: Large adult)   Pulse 75   Temp 36.6 °C (97.8 °F) (Temporal)   Resp 16   Ht 1.626 m (5' 4\")   Wt 102 kg (225 lb 3.2 oz)   SpO2 94%   BMI 38.66 kg/m²   Physical Exam  Constitutional:       Appearance: Normal appearance.   HENT:      Head: Normocephalic and atraumatic.      Right Ear: External ear normal.      Left Ear: External ear normal.      Nose: Nose normal.      Mouth/Throat:      Pharynx: Oropharynx is clear.   Cardiovascular:      Rate and Rhythm: Normal rate and regular rhythm.      Pulses: Normal pulses.   Pulmonary:      Effort: Pulmonary effort is normal.      Breath sounds: Normal breath sounds.   Abdominal:      General: Bowel sounds are normal.      Palpations: Abdomen is soft.      Tenderness: There is no abdominal tenderness.   Musculoskeletal:      Cervical back: Neck supple.      Right lower leg: No edema.      Left lower leg: No edema.   Skin:     General: Skin is warm and dry.      Comments: Clusters of blisters with red surrounding on left breast and left shoulder   Neurological:      Mental Status: She is alert. Mental status is at baseline.   Psychiatric:         Mood and Affect: Mood normal.         Behavior: Behavior normal.         Thought Content: Thought content normal.         Judgment: Judgment normal.           Assessment and Plan    1. Herpes zoster without complication  - valacyclovir (VALTREX) 1 GM Tab; Take 1 Tablet by mouth 3 times a day for 7 days.  Dispense: 21 Tablet; Refill: 0  - HYDROcodone-acetaminophen (NORCO) 5-325 MG Tab per tablet; Take 1 Tablet by mouth every 8 hours as needed (severe pain) for up to 10 days.  Dispense: 30 Tablet; Refill: 0  - gabapentin (NEURONTIN) 100 MG Cap; Take 1 Capsule by mouth 3 times a day.  Dispense: 90 Capsule; Refill: 1  She reports she does not do well with tramadol, she does well with Norco  Benefits, risks, and adverse reactions of medication discussed. Patient is " agreeable with initiating the medication.    Contact precaution discussed    2. Medication monitoring encounter  - URINE DRUG SCREEN; Future  - Controlled Substance Treatment Agreement   reviewed      3. Osteopenia of multiple sites  4/16/24  According to the World Health Organization classification, bone mineral density of this patient is normal in the lumbar spine, osteopenic in the proximal left femur and osteopenic in the distal left forearm.     10-year Probability of Fracture:  Major Osteoporotic     19.9%  Hip     4.3%  Population      [place] ()     Based on left femur neck BMD    calcium 1200 mg daily from all sources, vitamin D supplement    Consider fosamax    DMV Placard form filled out and returned to patient      Follow-up:Return in about 1 month (around 6/23/2024), or if symptoms worsen or fail to improve.    This note was created using voice recognition software. There may be unintended errors in spelling, grammar or content.

## 2024-05-23 NOTE — ASSESSMENT & PLAN NOTE
Painful blisters on left breast and left shoulder  Valtrex 1g TID for 7 days  Gabapentin and Norco for pain control  Contact precaution discussed

## 2024-05-23 NOTE — ASSESSMENT & PLAN NOTE
4/16/24  According to the World Health Organization classification, bone mineral density of this patient is normal in the lumbar spine, osteopenic in the proximal left femur and osteopenic in the distal left forearm.     10-year Probability of Fracture:  Major Osteoporotic     19.9%  Hip     4.3%  Population      [place] ()     Based on left femur neck BMD    calcium 1200 mg daily from all sources, vitamin D supplement    Consider fosamax

## 2024-05-25 LAB
AMPHET CTO UR CFM-MCNC: NEGATIVE NG/ML
BARBITURATES CTO UR CFM-MCNC: NEGATIVE NG/ML
BENZODIAZ CTO UR CFM-MCNC: NORMAL NG/ML
CANNABINOIDS CTO UR CFM-MCNC: NORMAL NG/ML
COCAINE CTO UR CFM-MCNC: NEGATIVE NG/ML
CREAT UR-MCNC: 170.9 MG/DL (ref 20–400)
DRUG COMMENT 753798: NORMAL
METHADONE CTO UR CFM-MCNC: NEGATIVE NG/ML
OPIATES CTO UR CFM-MCNC: NEGATIVE NG/ML
PCP CTO UR CFM-MCNC: NEGATIVE NG/ML
PROPOXYPH CTO UR CFM-MCNC: NEGATIVE NG/ML

## 2024-05-27 LAB — THC UR CFM-MCNC: >500 NG/ML

## 2024-05-28 LAB
1OH-MIDAZOLAM UR CFM-MCNC: <20 NG/ML
7AMINOCLONAZEPAM UR CFM-MCNC: <5 NG/ML
A-OH ALPRAZ UR CFM-MCNC: <5 NG/ML
ALPRAZ UR CFM-MCNC: <5 NG/ML
CHLORDIAZEP UR CFM-MCNC: <20 NG/ML
CLONAZEPAM UR CFM-MCNC: <5 NG/ML
DIAZEPAM UR CFM-MCNC: <20 NG/ML
LORAZEPAM UR CFM-MCNC: 1441 NG/ML
MIDAZOLAM UR CFM-MCNC: <20 NG/ML
NORDIAZEPAM UR CFM-MCNC: 84 NG/ML
OXAZEPAM UR CFM-MCNC: 64 NG/ML
TEMAZEPAM UR CFM-MCNC: 167 NG/ML

## 2024-06-01 NOTE — PROCEDURES
DIAGNOSTIC HOME SLEEP TEST (HST) REPORT WatchPAT      PATIENT ID:  NAME:  Iveth Daley  MRN:               [id number]  YOB: 1960  DATE OF STUDY:05/15/2024       Impression:     This study shows evidence of:      1. Moderate obstructive sleep apnea with PAT apnea hyponea index (pAHI 4%) of 18.4 per hour.  PAT respiratory disturbance index (pRDI) was 34.6 per hour. These findings are based on 7 channels recording of PAT signal with sleep staging, heart rate, pulse oximetry, actigraphy, body position, snoring and respiratory movement.     2. Oxygenation O2 Sat. mean O2 sat was 90%,  nayan was 87%,  and maximum O2 at 97 %. O2 sat was at or  below 88% for 8.0 min of evaluation time. Oxygen Desaturation (4%) Index was elevated at 14.7/hr. REM AHI at 3%: 40.1.  PAT apnea hyponea index (pAHI 3%) of 31.8 per hour.  AVG HR was 71 BPM.      TECHNICAL DESCRIPTION: Patient underwent home sleep apnea testing with peripheral arterial tone signal (WatchPAT™). This is a Type IV portable monitor and device. Monitoring was done with 7 channels recording of PAT signal with sleep staging, heart rate, pulse oximetry, actigraphy, body position, snoring and respiratory movement. Prior to using the device, the patient received verbal and written instructions for its application and was provided with the help desk phone number for additional telephonic instruction with 24-hour availability of qualified personnel to answer questions.    AHI  vs RDI:  The pRDI is calculated in a very similar way as the pAHI but an additional type of respiratory events named RERA are also counted. RERA  is the abbreviation for respiratory effort related arousal and is essentially a very short arousal of a few seconds that follows partial occlusion of the airway.  The normal range of the RDI score is also less than 5/hour.    General sleep summary: . Total recording time is 7 hours and 24 minutes and approximate sleep time is 6  hours and 36 minutes. The patient spent 29.0 minutes in the supine position and 367.1 minutes in the nonsupine position.  Supine AHI (3%) 50.2.    Recommendations:    CPAP titration study vs Auto CPAP trial.    Patient will do well on a ResMed APAP with initial settings from min 6 to max 16 cm H20. Careful mask fit and heated humidification are required part of this prescription. This patient will need to meet all insurance compliance requirements.     In general patients with sleep apnea are advised to avoid alcohol and sedatives and to not operate a motor vehicle while drowsy. In some cases alternative treatment options may prove effective in resolving sleep apnea in these options include upper airway surgery, the use of a dental orthotic or weight loss and positional therapy. Clinical correlation is required.

## 2024-06-11 ENCOUNTER — TELEPHONE (OUTPATIENT)
Dept: MEDICAL GROUP | Facility: IMAGING CENTER | Age: 64
End: 2024-06-11
Payer: MEDICARE

## 2024-06-11 DIAGNOSIS — B02.9 HERPES ZOSTER WITHOUT COMPLICATION: ICD-10-CM

## 2024-06-11 RX ORDER — VALACYCLOVIR HYDROCHLORIDE 1 G/1
1000 TABLET, FILM COATED ORAL 3 TIMES DAILY
Qty: 21 TABLET | Refills: 0 | Status: SHIPPED | OUTPATIENT
Start: 2024-06-11 | End: 2024-06-18

## 2024-06-11 NOTE — TELEPHONE ENCOUNTER
Caller Name: Iveth Daley   Call Back Number: [phone]    How would the patient prefer to be contacted with a response: Phone call OK to leave a detailed message    Pt was seen on 05/23/24 for shingles. Pt was on an antiviral medication. Pt called and stated that her rash has now become larger and would like another antiviral and stronger medication. Please advise if pt needs an appt.

## 2024-06-11 NOTE — PROGRESS NOTES
Herpes zoster without complication  -     valacyclovir (VALTREX) 1 GM Tab; Take 1 Tablet by mouth 3 times a day for 7 days.

## 2024-06-14 ENCOUNTER — OFFICE VISIT (OUTPATIENT)
Dept: MEDICAL GROUP | Facility: IMAGING CENTER | Age: 64
End: 2024-06-14
Payer: MEDICARE

## 2024-06-14 VITALS
TEMPERATURE: 97.8 F | OXYGEN SATURATION: 93 % | HEIGHT: 64 IN | BODY MASS INDEX: 38.07 KG/M2 | RESPIRATION RATE: 14 BRPM | WEIGHT: 223 LBS | DIASTOLIC BLOOD PRESSURE: 76 MMHG | SYSTOLIC BLOOD PRESSURE: 120 MMHG | HEART RATE: 86 BPM

## 2024-06-14 DIAGNOSIS — K21.9 GASTROESOPHAGEAL REFLUX DISEASE WITHOUT ESOPHAGITIS: ICD-10-CM

## 2024-06-14 DIAGNOSIS — M85.89 OSTEOPENIA OF MULTIPLE SITES: ICD-10-CM

## 2024-06-14 DIAGNOSIS — B02.9 HERPES ZOSTER WITHOUT COMPLICATION: ICD-10-CM

## 2024-06-14 PROCEDURE — 3074F SYST BP LT 130 MM HG: CPT | Performed by: INTERNAL MEDICINE

## 2024-06-14 PROCEDURE — 99214 OFFICE O/P EST MOD 30 MIN: CPT | Performed by: INTERNAL MEDICINE

## 2024-06-14 PROCEDURE — 3078F DIAST BP <80 MM HG: CPT | Performed by: INTERNAL MEDICINE

## 2024-06-14 RX ORDER — 0.9 % SODIUM CHLORIDE 0.9 %
10 VIAL (ML) INJECTION PRN
OUTPATIENT
Start: 2024-08-01

## 2024-06-14 RX ORDER — ALBUTEROL SULFATE 90 UG/1
2 AEROSOL, METERED RESPIRATORY (INHALATION) PRN
OUTPATIENT
Start: 2024-08-01

## 2024-06-14 RX ORDER — SODIUM CHLORIDE 9 MG/ML
INJECTION, SOLUTION INTRAVENOUS CONTINUOUS
OUTPATIENT
Start: 2024-08-01

## 2024-06-14 RX ORDER — DIPHENHYDRAMINE HYDROCHLORIDE 50 MG/ML
50 INJECTION INTRAMUSCULAR; INTRAVENOUS PRN
OUTPATIENT
Start: 2024-08-01

## 2024-06-14 RX ORDER — EPINEPHRINE 1 MG/ML(1)
0.5 AMPUL (ML) INJECTION PRN
OUTPATIENT
Start: 2024-08-01

## 2024-06-14 RX ORDER — 0.9 % SODIUM CHLORIDE 0.9 %
VIAL (ML) INJECTION PRN
OUTPATIENT
Start: 2024-08-01

## 2024-06-14 RX ORDER — ZOLEDRONIC ACID 5 MG/100ML
5 INJECTION, SOLUTION INTRAVENOUS ONCE
OUTPATIENT
Start: 2024-08-01 | End: 2024-08-01

## 2024-06-14 RX ORDER — METHYLPREDNISOLONE SODIUM SUCCINATE 125 MG/2ML
125 INJECTION, POWDER, LYOPHILIZED, FOR SOLUTION INTRAMUSCULAR; INTRAVENOUS PRN
OUTPATIENT
Start: 2024-08-01

## 2024-06-14 RX ORDER — 0.9 % SODIUM CHLORIDE 0.9 %
3 VIAL (ML) INJECTION PRN
OUTPATIENT
Start: 2024-08-01

## 2024-06-14 ASSESSMENT — FIBROSIS 4 INDEX: FIB4 SCORE: 1.36

## 2024-06-14 NOTE — ASSESSMENT & PLAN NOTE
Painful blisters on left breast and left shoulder  Completing 2 nd course Valtrex 1g TID   Tylenol prn for pain control  Contact precaution discussed

## 2024-06-14 NOTE — ASSESSMENT & PLAN NOTE
Heartburn is controlled  Famotidine 20 mg nightly   Still has intermittent upper abdominal bloating, Gas X as needed for excessive gas.

## 2024-06-14 NOTE — PROGRESS NOTES
Established Patient    Iveth Daley is a 63 y.o. female who presents today with the following:    CC:   Chief Complaint   Patient presents with    Follow-Up     Shingles          HPI:       History of Present Illness      Problem   Osteopenia of Multiple Sites    4/16/24  According to the World Health Organization classification, bone mineral density of this patient is normal in the lumbar spine, osteopenic in the proximal left femur and osteopenic in the distal left forearm.     10-year Probability of Fracture:  Major Osteoporotic     19.9%  Hip     4.3%  Population      [place] ()     Based on left femur neck BMD    calcium 1200 mg daily from all sources, vitamin D supplement  Her teeth are good  She has chronic GERD, she would be good candidate for Reclast 5 mg IV once for prevention of osteoporosis. We plan to do this in August 2024.  Referral to infusion clinic     Herpes Zoster Without Complication    Painful blisters on left breast and left shoulder  Completing 2 nd course Valtrex 1g TID   Tylenol prn for pain control  Contact precaution discussed     Gastroesophageal Reflux Disease Without Esophagitis    Heartburn is controlled  Famotidine 20 mg nightly   Still has intermittent upper abdominal bloating, Gas X as needed for excessive gas.             Current Outpatient Medications   Medication Sig    valacyclovir (VALTREX) 1 GM Tab Take 1 Tablet by mouth 3 times a day for 7 days.    citalopram (CELEXA) 10 MG tablet Take 1 Tablet by mouth every evening for 90 days.    propranolol (INDERAL) 10 MG Tab Take 1 Tablet by mouth 2 times a day for 90 days.    famotidine (PEPCID) 20 MG Tab Take 1 Tablet by mouth every evening.    polyethylene glycol/lytes (MIRALAX) Pack Take 17 g by mouth every day.    Wheat Dextrin (BENEFIBER PO) Take  by mouth.     Allergies   Allergen Reactions    Codeine Shortness of Breath    Sulfa Drugs Rash       Allergies, past medical history, past surgical history, medications,  "family history, social history reviewed and updated.    ROS Please see HPI    Physical Exam  Vitals: /76 (BP Location: Left arm, Patient Position: Sitting, BP Cuff Size: Adult)   Pulse 86   Temp 36.6 °C (97.8 °F) (Temporal)   Resp 14   Ht 1.626 m (5' 4\")   Wt 101 kg (223 lb)   SpO2 93%   BMI 38.28 kg/m²   Physical Exam  Constitutional:       Appearance: Normal appearance.   HENT:      Head: Normocephalic and atraumatic.      Right Ear: External ear normal.      Left Ear: External ear normal.      Nose: Nose normal.      Mouth/Throat:      Pharynx: Oropharynx is clear.   Cardiovascular:      Rate and Rhythm: Normal rate and regular rhythm.      Pulses: Normal pulses.   Pulmonary:      Effort: Pulmonary effort is normal.      Breath sounds: Normal breath sounds.   Abdominal:      General: Bowel sounds are normal.      Palpations: Abdomen is soft.      Tenderness: There is no abdominal tenderness.   Musculoskeletal:      Cervical back: Neck supple.      Right lower leg: No edema.      Left lower leg: No edema.   Skin:     General: Skin is warm and dry.      Comments: Healing pink rash on left breast and left shoulder   Neurological:      Mental Status: She is alert. Mental status is at baseline.   Psychiatric:         Mood and Affect: Mood normal.         Behavior: Behavior normal.         Thought Content: Thought content normal.         Judgment: Judgment normal.           Assessment and Plan    1. Herpes zoster without complication  Improving  Completing Valacyclovir  Using calamine prn  Tylenol prn for pain    2. Gastroesophageal reflux disease without esophagitis  On Pepcid    3. Osteopenia of multiple sites  4/16/24  According to the World Health Organization classification, bone mineral density of this patient is normal in the lumbar spine, osteopenic in the proximal left femur and osteopenic in the distal left forearm.     10-year Probability of Fracture:  Major Osteoporotic     19.9%  Hip     " 4.3%  Population      [place] ()     Based on left femur neck BMD    calcium 1200 mg daily from all sources, vitamin D supplement  Her teeth are good  She has chronic GERD, she would be good candidate for Reclast 5 mg IV once for prevention of osteoporosis. We plan to do this in August 2024.  Referral to infusion clinic  Benefits, risks, and adverse reactions of medication discussed. Patient is agreeable with initiating the medication.      Follow-up:Return in about 3 months (around 9/14/2024), or if symptoms worsen or fail to improve.    This note was created using voice recognition software. There may be unintended errors in spelling, grammar or content.

## 2024-06-14 NOTE — ASSESSMENT & PLAN NOTE
4/16/24  According to the World Health Organization classification, bone mineral density of this patient is normal in the lumbar spine, osteopenic in the proximal left femur and osteopenic in the distal left forearm.     10-year Probability of Fracture:  Major Osteoporotic     19.9%  Hip     4.3%  Population      [place] ()     Based on left femur neck BMD    calcium 1200 mg daily from all sources, vitamin D supplement  Her teeth are good  She has chronic GERD, she would be good candidate for Reclast 5 mg IV once for prevention of osteoporosis. We plan to do this in August 2024.  Referral to infusion clinic

## 2024-06-20 ENCOUNTER — OFFICE VISIT (OUTPATIENT)
Dept: SLEEP MEDICINE | Facility: MEDICAL CENTER | Age: 64
End: 2024-06-20
Attending: PREVENTIVE MEDICINE
Payer: MEDICARE

## 2024-06-20 VITALS
HEIGHT: 63 IN | WEIGHT: 222 LBS | BODY MASS INDEX: 39.34 KG/M2 | RESPIRATION RATE: 18 BRPM | OXYGEN SATURATION: 95 % | SYSTOLIC BLOOD PRESSURE: 122 MMHG | DIASTOLIC BLOOD PRESSURE: 70 MMHG | HEART RATE: 74 BPM

## 2024-06-20 DIAGNOSIS — G47.34 NOCTURNAL OXYGEN DESATURATION: ICD-10-CM

## 2024-06-20 DIAGNOSIS — G47.33 OSA (OBSTRUCTIVE SLEEP APNEA): ICD-10-CM

## 2024-06-20 DIAGNOSIS — G47.19 EXCESSIVE DAYTIME SLEEPINESS: ICD-10-CM

## 2024-06-20 PROCEDURE — 3078F DIAST BP <80 MM HG: CPT | Performed by: PREVENTIVE MEDICINE

## 2024-06-20 PROCEDURE — 99212 OFFICE O/P EST SF 10 MIN: CPT | Performed by: PREVENTIVE MEDICINE

## 2024-06-20 PROCEDURE — 3074F SYST BP LT 130 MM HG: CPT | Performed by: PREVENTIVE MEDICINE

## 2024-06-20 PROCEDURE — 99214 OFFICE O/P EST MOD 30 MIN: CPT | Performed by: PREVENTIVE MEDICINE

## 2024-06-20 ASSESSMENT — FIBROSIS 4 INDEX: FIB4 SCORE: 1.36

## 2024-06-20 NOTE — PROGRESS NOTES
"CHIEF COMPLIANT: \"How did I do on my sleep study?\"   LAST SEEN:  Dr. Rojas on  3/29/24  HISTORY OF PRESENT ILLNESS:  Ievth Daley is a 63 y.o.female who is here for sleep study results.     Sleep study results:   TYPE: [facility] HST  DATE: 5/15/2024  Diagnostic:AHI at 4%: 18.4, RDI 34.6    Diagnostic  Oxygen Mustapha: 87 % with 8.0 mins at or under 88%     Significant comorbidities and modifying factors: see below     PAST MEDICAL HISTORY:  Past Medical History:   Diagnosis Date    Anxiety     Back pain     Back pain     Bronchitis     Constipation     Depression     Diverticulitis of ascending colon 2021    abscess in the colon    Fatigue     GERD (gastroesophageal reflux disease)     Hearing difficulty     Obesity     Painful joint     Pneumonia     Weakness     Wears glasses     Wrist fracture 05/10/2023    Right wrist broken from a fall      PROBLEM LIST:  Patient Active Problem List    Diagnosis Date Noted    Osteopenia of multiple sites 05/23/2024    Herpes zoster without complication 05/23/2024    Toe pain, bilateral 04/09/2024    Major depressive disorder, recurrent episode, moderate (HCC) 03/05/2024    Generalized anxiety disorder 03/05/2024    Mild mood disorder (HCC) 03/05/2024    Nausea 02/06/2024    Constipation 02/06/2024    Administrative encounter 01/26/2024    Gastroesophageal reflux disease without esophagitis 01/16/2024    Abdominal bloating 01/04/2024    Stomach problems 12/28/2023    At risk for sleep apnea 12/12/2023    Depression 12/12/2023    Mood swings 12/12/2023    Fine tremor 12/12/2023    Closed fracture of distal end of right radius with malunion 12/12/2023    Chronic right hip pain 12/12/2023    Acute non-recurrent maxillary sinusitis 12/12/2023     PAST SOCIAL HISTORY:  Past Surgical History:   Procedure Laterality Date    CARPAL TUNNEL RELEASE      CHOLECYSTECTOMY      2021    COLON RESECTION       PAST FAMILY HISTORY:  Family History   Problem Relation Age of Onset    Stroke " Mother     Parkinson's Disease Father     Cancer Father         Parkinson’s disease    Cancer Sister         MM    No Known Problems Brother     Ovarian Cancer Neg Hx     Tubal Cancer Neg Hx     Peritoneal Cancer Neg Hx     Colorectal Cancer Neg Hx     Breast Cancer Neg Hx     Bilateral Breast Cancer Neg Hx     BRCA 1 Neg Hx     BRCA 1 Carrier  Neg Hx     BRCA 2 Neg Hx     BRCA 2 Carrier Neg Hx      SOCIAL HISTORY:  Social History     Socioeconomic History    Marital status: Single     Spouse name: Not on file    Number of children: Not on file    Years of education: Not on file    Highest education level: Associate degree: academic program   Occupational History    Not on file   Tobacco Use    Smoking status: Former     Current packs/day: 0.00     Types: Cigarettes     Start date: 1979     Quit date: 1981     Years since quittin.0     Passive exposure: Yes    Smokeless tobacco: Never    Tobacco comments:     Smoked in my 20’s socially with meals or cocktails.   Vaping Use    Vaping status: Some Days    Substances: THC   Substance and Sexual Activity    Alcohol use: Yes     Alcohol/week: 0.6 oz     Types: 1 Glasses of wine per week     Comment: On occasion wine with dinner    Drug use: Not Currently     Frequency: 2.0 times per week     Types: Marijuana    Sexual activity: Not Currently     Partners: Female     Birth control/protection: Condom, Pill   Other Topics Concern    Not on file   Social History Narrative    Not on file     Social Determinants of Health     Financial Resource Strain: Low Risk  (2023)    Overall Financial Resource Strain (CARDIA)     Difficulty of Paying Living Expenses: Not very hard   Food Insecurity: No Food Insecurity (2023)    Hunger Vital Sign     Worried About Running Out of Food in the Last Year: Never true     Ran Out of Food in the Last Year: Never true   Transportation Needs: No Transportation Needs (2023)    PRAPARE - Transportation     Lack of  "Transportation (Medical): No     Lack of Transportation (Non-Medical): No   Physical Activity: Insufficiently Active (12/7/2023)    Exercise Vital Sign     Days of Exercise per Week: 2 days     Minutes of Exercise per Session: 20 min   Stress: Stress Concern Present (12/7/2023)    [employer] [place] [employer] - Occupational Stress Questionnaire     Feeling of Stress : Rather much   Social Connections: Socially Isolated (12/7/2023)    Social Connection and Isolation Panel [NHANES]     Frequency of Communication with Friends and Family: More than three times a week     Frequency of Social Gatherings with Friends and Family: Once a week     Attends Mosque Services: Never     Active Member of Clubs or Organizations: No     Attends Club or Organization Meetings: Never     Marital Status: Never    Intimate Partner Violence: Not on file   Housing Stability: Low Risk  (12/7/2023)    Housing Stability Vital Sign     Unable to Pay for Housing in the Last Year: No     Number of Places Lived in the Last Year: 1     Unstable Housing in the Last Year: No     ALLERGIES: Codeine and Sulfa drugs  MEDICATIONS:  Current Outpatient Medications   Medication Sig Dispense Refill    citalopram (CELEXA) 10 MG tablet Take 1 Tablet by mouth every evening for 90 days. 90 Tablet 0    propranolol (INDERAL) 10 MG Tab Take 1 Tablet by mouth 2 times a day for 90 days. 180 Tablet 0    famotidine (PEPCID) 20 MG Tab Take 1 Tablet by mouth every evening. 90 Tablet 3    polyethylene glycol/lytes (MIRALAX) Pack Take 17 g by mouth every day.      Wheat Dextrin (BENEFIBER PO) Take  by mouth.       No current facility-administered medications for this visit.    \"CURRENT RX\"    REVIEW OF SYSTEMS:  Constitutional: Denies weight loss, denies chronic daytime fatigue.  See HPI      PHYSICAL EXAM/VITALS:  /70 (BP Location: Right arm, Patient Position: Sitting, BP Cuff Size: Adult)   Pulse 74   Resp 18   Ht 1.6 m (5' 3\")   Wt 101 kg " (222 lb)   SpO2 95%   BMI 39.33 kg/m²   Appearance: Well-nourished, well-developed,  looks stated age, no acute distress  Eyes:   EOMI  ENMT:  WNL  Neck: Supple, trachea midline  Respiratory effort:  No intercostal retractions or use of accessory muscles  Musculoskeletal:  Grossly normal; gait and station normal  Neurologic:  oriented to person, time, place, and purpose; judgement intact  Psychiatric:  No depression, anxiety, agitation      MEDICAL DECISION MAKING:  The medical record was reviewed as it pertains to this referral. This includes records from primary care,consultants notes, referral request, hospital records, labs and imaging. Any available diagnostic and titration nocturnal polysomnograms, home sleep apnea tests, continuous nocturnal oximetry results, multiple sleep latency tests, and recent compliance reports were reviewed with the patient.    ASSESSMENT/PLAN:  Iveth Daley is a 63 y.o.female who has moderate to severe KARYN and mild nocturnal hypoxia.  A ResMed APAP machine will be ordered    Minimum pressure of 6 with a maximum pressure of 16 cm of water and heated humidification with data available online. Access to this patient's compliance and usage data will allow for a further empiric titration.   A careful mask fit is required.  Mask per patient's choice.  This patient will need to meet all insurance requirements for compliance.      DIAGNOSES :        1. Nocturnal oxygen desaturation  - DME Mask Fitting; Future  - DME CPAP    2. KARYN (obstructive sleep apnea)  - DME Mask Fitting; Future  - DME CPAP    3. Excessive daytime sleepiness  - DME Mask Fitting; Future  - DME CPAP      MEETING INSURANCE COMPLIANCE REQUIREMENTS and MISC tips:    The patient has 90 days in order to be deemed compliant by the insurance company.  The 90-day starts the day that the patient receives the machine and supplies from the DME.  It is during this period of time that the patient must demonstrate a  consistent use of pap (greater than 4 hours/day for a minimum of 23 days out of 30).  The patient is aware that  PAP usage ( time) will be added up--  together over a 24-hour period.  This simply means that the patient does not have to use the machine for 4 hours in a row and the patient does not have to be asleep for the minutes to count towards the required 4 hours.     It is recommended to the patient that the patient begin Pap therapy by first just wearing the mask and headgear loosely applied to the face for 20 minutes a day for the first 2-3 days.  Then  the patient may begin using the mask and headgear with the machine to get a feel for using the mask with a good seal This should be accomplished by using the entire PAP set up with the machine on for 20 to 30 minute(the ramp time) while the patient is awake.  Try this for 2 days.  These usage exercises will  allow the face and brain to get accustomed to mask, headgear and air pressures.  Now the patient can begin to use the Pap therapy for sleep.     Also  the patient is instructed to keep the machine located slightly ( 6-12 ins)  below the level of the mattress.  This allows for proper humidification of the air before it reaches nasal passages and prevents inhaling water droplets.      Cleaning the mask, headgear, tubing, water reservoir is to be done using hot water and a gentle detergent once a week.        PAP THERAPY  EQUIPMENT AND SUPPLIES SCHEDULE    Mask cushion every month  Nasal pillows 2 times per month  Mask every 6 months  Head gear every 6 months  Tubing every 3 months  Ultra-fine filters 2 times per month  Foam filter every 6 months  Humidifier chamber every 6 months     The risks of untreated sleep apnea were discussed with the patient at length. Patients with KARYN are at increased risk of cardiovascular disease including coronary artery disease, systemic arterial hypertension, pulmonary arterial hypertension, cardiac arrhythmias, and stroke.  KARYN patients have an increased risk of motor vehicle accidents, type 2 diabetes, chronic kidney disease, and non-alcoholic liver disease. The patient was advised to avoid driving a motor vehicle when drowsy.  Have advised the patient to follow up with the appropriate healthcare practitioners for all other medical problems and issues.    RETURN TO CLINIC: Return in about 3 months (around 9/20/2024) for with any available provider.    My total time spent caring for the patient on the day of the encounter was 40 minutes. This includes time spent on a thorough chart review including other physician notes, all sleep studies, as well as critical labs and pulmonary and cardiac studies.  Additionally, it includes a thorough discussion of good sleep hygiene and stimulus control, as well as  the need for consistency in terms of sleep preparation and practice.    Please note that this dictation was created using voice recognition software.  I have made every reasonable attempt to correct obvious errors, I expect that there are errors of grammar and possibly content that I did not discover before finalizing this note.

## 2024-06-27 ENCOUNTER — APPOINTMENT (OUTPATIENT)
Dept: MEDICAL GROUP | Facility: IMAGING CENTER | Age: 64
End: 2024-06-27
Payer: MEDICARE

## 2024-07-05 DIAGNOSIS — F41.1 GENERALIZED ANXIETY DISORDER: ICD-10-CM

## 2024-07-05 RX ORDER — CITALOPRAM HYDROBROMIDE 10 MG/1
10 TABLET ORAL NIGHTLY
Qty: 90 TABLET | Refills: 0 | Status: SHIPPED | OUTPATIENT
Start: 2024-07-05 | End: 2024-07-30 | Stop reason: SDUPTHER

## 2024-07-05 RX ORDER — LORAZEPAM 0.5 MG/1
0.5 TABLET ORAL DAILY
Qty: 30 TABLET | Refills: 0 | Status: SHIPPED | OUTPATIENT
Start: 2024-07-05 | End: 2024-07-30 | Stop reason: SDUPTHER

## 2024-07-24 ENCOUNTER — TELEPHONE (OUTPATIENT)
Dept: SLEEP MEDICINE | Facility: MEDICAL CENTER | Age: 64
End: 2024-07-24
Payer: MEDICARE

## 2024-07-28 DIAGNOSIS — G25.2 FINE TREMOR: ICD-10-CM

## 2024-07-29 RX ORDER — PROPRANOLOL HYDROCHLORIDE 10 MG/1
10 TABLET ORAL 2 TIMES DAILY
Qty: 180 TABLET | Refills: 0 | Status: SHIPPED | OUTPATIENT
Start: 2024-07-29

## 2024-07-30 ENCOUNTER — OFFICE VISIT (OUTPATIENT)
Dept: BEHAVIORAL HEALTH | Facility: CLINIC | Age: 64
End: 2024-07-30
Payer: MEDICARE

## 2024-07-30 DIAGNOSIS — F41.1 GENERALIZED ANXIETY DISORDER: ICD-10-CM

## 2024-07-30 DIAGNOSIS — F33.1 MAJOR DEPRESSIVE DISORDER, RECURRENT EPISODE, MODERATE (HCC): ICD-10-CM

## 2024-07-30 RX ORDER — LORAZEPAM 0.5 MG/1
0.5 TABLET ORAL DAILY
Qty: 30 TABLET | Refills: 0 | Status: SHIPPED | OUTPATIENT
Start: 2024-07-30 | End: 2024-08-29

## 2024-07-30 RX ORDER — CITALOPRAM HYDROBROMIDE 10 MG/1
10 TABLET ORAL NIGHTLY
Qty: 90 TABLET | Refills: 0 | Status: SHIPPED | OUTPATIENT
Start: 2024-07-30 | End: 2024-10-28

## 2024-08-13 ENCOUNTER — TELEPHONE (OUTPATIENT)
Dept: SCHEDULING | Facility: IMAGING CENTER | Age: 64
End: 2024-08-13
Payer: MEDICARE

## 2024-08-13 NOTE — TELEPHONE ENCOUNTER
Sleep MA's    Caller: Iveth Zabala Edi     Topic/issue: Iveth just received her CPAP machine and was told to schedule a follow up appointment 30 days out with Dr. Rojas, however it would appear as though there is not sufficient availability to meet those requirements. The patient is currently scheduled for 9/27/2024 at 2:00 pm with Dr. Rojas.    Callback Number: [phone]

## 2024-08-16 ENCOUNTER — OUTPATIENT INFUSION SERVICES (OUTPATIENT)
Dept: ONCOLOGY | Facility: MEDICAL CENTER | Age: 64
End: 2024-08-16
Attending: INTERNAL MEDICINE
Payer: MEDICARE

## 2024-08-16 ENCOUNTER — OFFICE VISIT (OUTPATIENT)
Dept: MEDICAL GROUP | Facility: IMAGING CENTER | Age: 64
End: 2024-08-16
Payer: MEDICARE

## 2024-08-16 ENCOUNTER — HOSPITAL ENCOUNTER (OUTPATIENT)
Dept: LAB | Facility: MEDICAL CENTER | Age: 64
End: 2024-08-16
Attending: INTERNAL MEDICINE
Payer: MEDICARE

## 2024-08-16 VITALS
OXYGEN SATURATION: 94 % | RESPIRATION RATE: 18 BRPM | HEART RATE: 81 BPM | SYSTOLIC BLOOD PRESSURE: 116 MMHG | TEMPERATURE: 97.9 F | WEIGHT: 218.7 LBS | BODY MASS INDEX: 37.34 KG/M2 | HEIGHT: 64 IN | DIASTOLIC BLOOD PRESSURE: 69 MMHG

## 2024-08-16 VITALS
WEIGHT: 217 LBS | HEIGHT: 64 IN | RESPIRATION RATE: 16 BRPM | OXYGEN SATURATION: 96 % | TEMPERATURE: 98.2 F | BODY MASS INDEX: 37.05 KG/M2 | HEART RATE: 97 BPM | SYSTOLIC BLOOD PRESSURE: 118 MMHG | DIASTOLIC BLOOD PRESSURE: 84 MMHG

## 2024-08-16 DIAGNOSIS — R10.9 RIGHT FLANK PAIN: ICD-10-CM

## 2024-08-16 DIAGNOSIS — N39.3 STRESS INCONTINENCE OF URINE: ICD-10-CM

## 2024-08-16 DIAGNOSIS — M85.89 OSTEOPENIA OF MULTIPLE SITES: ICD-10-CM

## 2024-08-16 LAB
ALBUMIN SERPL BCP-MCNC: 4.1 G/DL (ref 3.2–4.9)
ALBUMIN/GLOB SERPL: 1.5 G/DL
ALP SERPL-CCNC: 79 U/L (ref 30–99)
ALT SERPL-CCNC: 11 U/L (ref 2–50)
ANION GAP SERPL CALC-SCNC: 12 MMOL/L (ref 7–16)
APPEARANCE UR: ABNORMAL
AST SERPL-CCNC: 17 U/L (ref 12–45)
BACTERIA #/AREA URNS HPF: ABNORMAL /HPF
BASOPHILS # BLD AUTO: 0.4 % (ref 0–1.8)
BASOPHILS # BLD: 0.03 K/UL (ref 0–0.12)
BILIRUB SERPL-MCNC: 0.5 MG/DL (ref 0.1–1.5)
BILIRUB UR QL STRIP.AUTO: NEGATIVE
BUN SERPL-MCNC: 9 MG/DL (ref 8–22)
CA-I BLD ISE-SCNC: 1.19 MMOL/L (ref 1.1–1.3)
CALCIUM ALBUM COR SERPL-MCNC: 9.1 MG/DL (ref 8.5–10.5)
CALCIUM SERPL-MCNC: 9.2 MG/DL (ref 8.5–10.5)
CAOX CRY #/AREA URNS HPF: ABNORMAL /HPF
CHLORIDE SERPL-SCNC: 104 MMOL/L (ref 96–112)
CO2 SERPL-SCNC: 21 MMOL/L (ref 20–33)
COLOR UR: YELLOW
CREAT BLD-MCNC: 0.7 MG/DL (ref 0.5–1.4)
CREAT SERPL-MCNC: 0.65 MG/DL (ref 0.5–1.4)
EOSINOPHIL # BLD AUTO: 0.04 K/UL (ref 0–0.51)
EOSINOPHIL NFR BLD: 0.6 % (ref 0–6.9)
EPI CELLS #/AREA URNS HPF: ABNORMAL /HPF
ERYTHROCYTE [DISTWIDTH] IN BLOOD BY AUTOMATED COUNT: 47.4 FL (ref 35.9–50)
GFR SERPLBLD CREATININE-BSD FMLA CKD-EPI: 97 ML/MIN/1.73 M 2
GLOBULIN SER CALC-MCNC: 2.8 G/DL (ref 1.9–3.5)
GLUCOSE SERPL-MCNC: 109 MG/DL (ref 65–99)
GLUCOSE UR STRIP.AUTO-MCNC: NEGATIVE MG/DL
HCT VFR BLD AUTO: 46.6 % (ref 37–47)
HGB BLD-MCNC: 15.2 G/DL (ref 12–16)
IMM GRANULOCYTES # BLD AUTO: 0.01 K/UL (ref 0–0.11)
IMM GRANULOCYTES NFR BLD AUTO: 0.1 % (ref 0–0.9)
KETONES UR STRIP.AUTO-MCNC: NEGATIVE MG/DL
LEUKOCYTE ESTERASE UR QL STRIP.AUTO: NEGATIVE
LIPASE SERPL-CCNC: 23 U/L (ref 11–82)
LYMPHOCYTES # BLD AUTO: 2.21 K/UL (ref 1–4.8)
LYMPHOCYTES NFR BLD: 30.7 % (ref 22–41)
MCH RBC QN AUTO: 31.1 PG (ref 27–33)
MCHC RBC AUTO-ENTMCNC: 32.6 G/DL (ref 32.2–35.5)
MCV RBC AUTO: 95.3 FL (ref 81.4–97.8)
MICRO URNS: ABNORMAL
MONOCYTES # BLD AUTO: 0.45 K/UL (ref 0–0.85)
MONOCYTES NFR BLD AUTO: 6.3 % (ref 0–13.4)
MUCOUS THREADS #/AREA URNS HPF: ABNORMAL /HPF
NEUTROPHILS # BLD AUTO: 4.45 K/UL (ref 1.82–7.42)
NEUTROPHILS NFR BLD: 61.9 % (ref 44–72)
NITRITE UR QL STRIP.AUTO: NEGATIVE
NRBC # BLD AUTO: 0 K/UL
NRBC BLD-RTO: 0 /100 WBC (ref 0–0.2)
PH UR STRIP.AUTO: 7 [PH] (ref 5–8)
PLATELET # BLD AUTO: 314 K/UL (ref 164–446)
PMV BLD AUTO: 10.1 FL (ref 9–12.9)
POTASSIUM SERPL-SCNC: 4.2 MMOL/L (ref 3.6–5.5)
PROT SERPL-MCNC: 6.9 G/DL (ref 6–8.2)
PROT UR QL STRIP: NEGATIVE MG/DL
RBC # BLD AUTO: 4.89 M/UL (ref 4.2–5.4)
RBC # URNS HPF: ABNORMAL /HPF
RBC UR QL AUTO: NEGATIVE
SODIUM SERPL-SCNC: 137 MMOL/L (ref 135–145)
SP GR UR STRIP.AUTO: 1.02
UROBILINOGEN UR STRIP.AUTO-MCNC: 0.2 MG/DL
WBC # BLD AUTO: 7.2 K/UL (ref 4.8–10.8)
WBC #/AREA URNS HPF: ABNORMAL /HPF

## 2024-08-16 PROCEDURE — 81001 URINALYSIS AUTO W/SCOPE: CPT

## 2024-08-16 PROCEDURE — 80053 COMPREHEN METABOLIC PANEL: CPT

## 2024-08-16 PROCEDURE — 83690 ASSAY OF LIPASE: CPT

## 2024-08-16 PROCEDURE — 99214 OFFICE O/P EST MOD 30 MIN: CPT | Performed by: INTERNAL MEDICINE

## 2024-08-16 PROCEDURE — 3074F SYST BP LT 130 MM HG: CPT | Performed by: INTERNAL MEDICINE

## 2024-08-16 PROCEDURE — 85025 COMPLETE CBC W/AUTO DIFF WBC: CPT

## 2024-08-16 PROCEDURE — 3079F DIAST BP 80-89 MM HG: CPT | Performed by: INTERNAL MEDICINE

## 2024-08-16 PROCEDURE — 82330 ASSAY OF CALCIUM: CPT

## 2024-08-16 PROCEDURE — 96365 THER/PROPH/DIAG IV INF INIT: CPT

## 2024-08-16 PROCEDURE — 82565 ASSAY OF CREATININE: CPT

## 2024-08-16 PROCEDURE — 700111 HCHG RX REV CODE 636 W/ 250 OVERRIDE (IP): Mod: JZ | Performed by: INTERNAL MEDICINE

## 2024-08-16 PROCEDURE — 36415 COLL VENOUS BLD VENIPUNCTURE: CPT

## 2024-08-16 PROCEDURE — 96374 THER/PROPH/DIAG INJ IV PUSH: CPT

## 2024-08-16 RX ORDER — HYDROCODONE BITARTRATE AND ACETAMINOPHEN 5; 325 MG/1; MG/1
TABLET ORAL
COMMUNITY
Start: 2024-06-27

## 2024-08-16 RX ORDER — ALBUTEROL SULFATE 90 UG/1
2 AEROSOL, METERED RESPIRATORY (INHALATION) PRN
Status: CANCELLED | OUTPATIENT
Start: 2024-08-16

## 2024-08-16 RX ORDER — 0.9 % SODIUM CHLORIDE 0.9 %
10 VIAL (ML) INJECTION PRN
Status: CANCELLED | OUTPATIENT
Start: 2024-08-16

## 2024-08-16 RX ORDER — ZOLEDRONIC ACID 5 MG/100ML
5 INJECTION, SOLUTION INTRAVENOUS ONCE
Status: COMPLETED | OUTPATIENT
Start: 2024-08-16 | End: 2024-08-16

## 2024-08-16 RX ORDER — METHYLPREDNISOLONE SODIUM SUCCINATE 125 MG/2ML
125 INJECTION, POWDER, LYOPHILIZED, FOR SOLUTION INTRAMUSCULAR; INTRAVENOUS PRN
Status: CANCELLED | OUTPATIENT
Start: 2024-08-16

## 2024-08-16 RX ORDER — EPINEPHRINE 1 MG/ML(1)
0.5 AMPUL (ML) INJECTION PRN
Status: CANCELLED | OUTPATIENT
Start: 2024-08-16

## 2024-08-16 RX ORDER — SODIUM CHLORIDE 9 MG/ML
INJECTION, SOLUTION INTRAVENOUS CONTINUOUS
Status: CANCELLED | OUTPATIENT
Start: 2024-08-16

## 2024-08-16 RX ORDER — DIPHENHYDRAMINE HYDROCHLORIDE 50 MG/ML
50 INJECTION INTRAMUSCULAR; INTRAVENOUS PRN
Status: CANCELLED | OUTPATIENT
Start: 2024-08-16

## 2024-08-16 RX ORDER — ZOLEDRONIC ACID 5 MG/100ML
5 INJECTION, SOLUTION INTRAVENOUS ONCE
Status: CANCELLED | OUTPATIENT
Start: 2024-08-16 | End: 2024-08-16

## 2024-08-16 RX ORDER — 0.9 % SODIUM CHLORIDE 0.9 %
3 VIAL (ML) INJECTION PRN
Status: CANCELLED | OUTPATIENT
Start: 2024-08-16

## 2024-08-16 RX ORDER — 0.9 % SODIUM CHLORIDE 0.9 %
VIAL (ML) INJECTION PRN
Status: CANCELLED | OUTPATIENT
Start: 2024-08-16

## 2024-08-16 RX ADMIN — ZOLEDRONIC ACID 5 MG: 5 INJECTION, SOLUTION INTRAVENOUS at 16:00

## 2024-08-16 ASSESSMENT — FIBROSIS 4 INDEX
FIB4 SCORE: 1.36
FIB4 SCORE: 1.36

## 2024-08-16 NOTE — TELEPHONE ENCOUNTER
SPOKE WITH PT AND SHE INFORMED ME SHE WAS SCHEDULED FOR TWO DIFFERENT APPOINTMENT BUT WAS NOT SURE IF IT WOULD BE OKAY TO BE SEEN 9/27 OR 10/11 . PT WAS INFORMED THAT SHE WOULD NEED TO BE SEEN 31-90 DAYS AFTER BEING ON THERAPY.     APPT FOR 9/27 HAD BE CANCELLED WITH DR. PASTRANA     PT WILL BE SEEING ELISABETH GONSALES PA-C ON 10/11/2024

## 2024-08-16 NOTE — PROGRESS NOTES
Ms Daley is here today for Zometa infusion.    She denies any loose or broken teeth, no jaw pain and no invasive procedures. Denies any s/s of hypocalcemia.   Reports taking calcium and vitamin D as prescribed.    IV placed to the right arm . Istat labs are within parameters for treatment today.    Zometa administered per MAR and was tolerated well.       Pt was discharged in stable condition.

## 2024-08-16 NOTE — PROGRESS NOTES
Established Patient    Iveth Daley is a 63 y.o. female who presents today with the following:    CC:   Chief Complaint   Patient presents with    Paperwork     Redo DMV paperwork    Back Pain     Right side, x3 weeks  Incontinence    Medication Management     Pt would like to discuss ozempic        HPI:       History of Present Illness    Right side pain for weeks, constant, 5-6/10  Denies dysuria, hematuria, nausea, vomiting, diarrhea  Denies trauma  Better with supine position, ice and tylenol  No aggravating factors  No rash     History of kidney stones    Stress urinary incontinence for around three years more after her bowel surgery due to diverticulitis.        Body mass index is 37.25 kg/m².  Healthful lifestyle measures  We discussed about ozempic and likely medicare is not going to cover this as she does not have diabetes and CVD.    No problems updated.     Current Outpatient Medications   Medication Sig    HYDROcodone-acetaminophen (NORCO) 5-325 MG Tab per tablet TAKE 1 TABLET BY MOUTH EVERY 6 HOURS AS NEEDED FOR PAIN M54.16 7 DAYS SUPPLY    citalopram (CELEXA) 10 MG tablet Take 1 Tablet by mouth every evening for 90 days.    LORazepam (ATIVAN) 0.5 MG Tab Take 1 Tablet by mouth every day at 6 PM for 30 days.    famotidine (PEPCID) 20 MG Tab Take 1 Tablet by mouth every evening.    polyethylene glycol/lytes (MIRALAX) Pack Take 17 g by mouth every day.    propranolol (INDERAL) 10 MG Tab TAKE 1 TABLET BY MOUTH 2 TIMES A DAY (Patient not taking: Reported on 8/16/2024)    Wheat Dextrin (BENEFIBER PO) Take  by mouth.     Allergies   Allergen Reactions    Codeine Shortness of Breath    Sulfa Drugs Rash       Allergies, past medical history, past surgical history, medications, family history, social history reviewed and updated.    ROS Please see HPI    Physical Exam  Vitals: /84 (BP Location: Left arm, Patient Position: Sitting, BP Cuff Size: Adult)   Pulse 97   Temp 36.8 °C (98.2 °F) (Temporal)  "  Resp 16   Ht 1.626 m (5' 4\")   Wt 98.4 kg (217 lb)   SpO2 96%   BMI 37.25 kg/m²   Physical Exam  Constitutional:       Appearance: Normal appearance.   HENT:      Head: Normocephalic and atraumatic.      Right Ear: External ear normal.      Left Ear: External ear normal.      Nose: Nose normal.      Mouth/Throat:      Pharynx: Oropharynx is clear.   Cardiovascular:      Rate and Rhythm: Normal rate and regular rhythm.      Pulses: Normal pulses.   Pulmonary:      Effort: Pulmonary effort is normal.      Breath sounds: Normal breath sounds.   Abdominal:      General: Bowel sounds are normal.      Palpations: Abdomen is soft.      Tenderness: There is no abdominal tenderness.      Comments: Mild right side tenderness  Caraballo negative   Musculoskeletal:      Cervical back: Neck supple.      Right lower leg: No edema.      Left lower leg: No edema.   Skin:     General: Skin is warm and dry.   Neurological:      Mental Status: She is alert. Mental status is at baseline.   Psychiatric:         Mood and Affect: Mood normal.         Behavior: Behavior normal.         Thought Content: Thought content normal.         Judgment: Judgment normal.               Assessment and Plan    1. Stress incontinence of urine  - Referral to Urology    2. Right flank pain  - CT-ABDOMEN-PELVIS WITH; Future  - CBC WITH DIFFERENTIAL; Future  - Comp Metabolic Panel; Future  - LIPASE; Future  - URINALYSIS,CULTURE IF INDICATED; Future    Right side pain for weeks, constant, 5-6/10  Denies dysuria, hematuria, nausea, vomiting, diarrhea  Denies trauma  Better with supine position, ice and tylenol  No aggravating factors  No rash     History of kidney stones  Supportive  Initiate work-up    3. BMI 37.0-37.9, adult  Body mass index is 37.25 kg/m².  Healthful lifestyle measures      Follow-up:Return in about 1 month (around 9/16/2024), or if symptoms worsen or fail to improve.    This note was created using voice recognition software. There may " be unintended errors in spelling, grammar or content.

## 2024-08-23 ENCOUNTER — TELEPHONE (OUTPATIENT)
Dept: MEDICAL GROUP | Facility: IMAGING CENTER | Age: 64
End: 2024-08-23
Payer: MEDICARE

## 2024-08-23 DIAGNOSIS — B00.2 RECURRENT ORAL HERPES SIMPLEX: ICD-10-CM

## 2024-08-23 RX ORDER — VALACYCLOVIR HYDROCHLORIDE 500 MG/1
500 TABLET, FILM COATED ORAL 2 TIMES DAILY
Qty: 10 TABLET | Refills: 0 | Status: SHIPPED | OUTPATIENT
Start: 2024-08-23 | End: 2024-08-28

## 2024-08-23 NOTE — TELEPHONE ENCOUNTER
Caller Name: Iveth Daley   Call Back Number: [phone] (home)      How would the patient prefer to be contacted with a response: Phone call do NOT leave a detailed message    Patient called stating that she has two sores on her lips. They are bothering her. She is requesting to get a prescription of acyclovir sent to [id number] to treat the sores.

## 2024-08-24 NOTE — PROGRESS NOTES
Recurrent oral herpes simplex  -     valACYclovir (VALTREX) 500 MG Tab; Take 1 Tablet by mouth 2 times a day for 5 days.    Twice a year

## 2024-09-10 DIAGNOSIS — U07.1 COVID-19 VIRUS INFECTION: ICD-10-CM

## 2024-09-11 NOTE — PROGRESS NOTES
COVID-19 virus infection  -     Nirmatrelvir&Ritonavir 300/100 20 x 150 MG & 10 x 100MG Tablet Therapy Pack; Take 300 mg nirmatrelvir (two 150 mg tablets) with 100 mg ritonavir (one 100 mg tablet) by mouth, with all three tablets taken together twice daily for 5 days.    Gaudencio Lazaro,    Need to wait for three months for Covid vaccine. I am sending in 5 day course of Paxlovid, antiviral medication for COVID infection, which can help you recover faster from. If you have additional question recommend to schedule a virtual appointment with one of primary care or urgent care provider. Take care and get well soon!    --Increase fluid intake, rest.  --Nasal irrigation or a Neti-pot if you have sinus congestion  --Humidifier in room or hot shower/bath.  --warm salt water gargles for sore throat  --Tylenol as needed for aches and pain, fever.  --Robitussin DM as needed for cough and thick phlegm  --Intranasal steroids (Flonase, prescription or over-the-counter) for sinus congestion  --Saline nasal spray  -- Hot tea + honey + fresh lemon juice  -- Honey by itself has been shown to help provide cough relief  --Prevention: regular handwashing, facemask, social distancing, cover cough, avoid immunocompromised patients, and stay at home.  -- advised to monitor pulse oximetry at home and to maintain O2 sat 90% or above  -- strict ED precautions if worsening mental status, chest pain, shortness of breath, lack of urination    Thank you!    Dr. Jones

## 2024-09-17 ENCOUNTER — APPOINTMENT (OUTPATIENT)
Dept: MEDICAL GROUP | Facility: IMAGING CENTER | Age: 64
End: 2024-09-17
Payer: MEDICARE

## 2024-09-17 VITALS
TEMPERATURE: 98.4 F | HEIGHT: 63 IN | OXYGEN SATURATION: 96 % | BODY MASS INDEX: 37.88 KG/M2 | WEIGHT: 213.8 LBS | RESPIRATION RATE: 18 BRPM | SYSTOLIC BLOOD PRESSURE: 128 MMHG | HEART RATE: 84 BPM | DIASTOLIC BLOOD PRESSURE: 70 MMHG

## 2024-09-17 DIAGNOSIS — R10.9 ABDOMINAL PAIN, UNSPECIFIED ABDOMINAL LOCATION: ICD-10-CM

## 2024-09-17 DIAGNOSIS — U07.1 COVID-19 VIRUS INFECTION: ICD-10-CM

## 2024-09-17 PROCEDURE — 99214 OFFICE O/P EST MOD 30 MIN: CPT | Performed by: INTERNAL MEDICINE

## 2024-09-17 PROCEDURE — 3078F DIAST BP <80 MM HG: CPT | Performed by: INTERNAL MEDICINE

## 2024-09-17 PROCEDURE — 3074F SYST BP LT 130 MM HG: CPT | Performed by: INTERNAL MEDICINE

## 2024-09-17 ASSESSMENT — FIBROSIS 4 INDEX: FIB4 SCORE: 1.06

## 2024-09-17 NOTE — ASSESSMENT & PLAN NOTE
Recent COVID-19 infection (9/10/24), gradually improving  Did not tolerate Paxlovid due to GI  Poor appetite  Change in taste  No fever, chills,   Still congested, trial of flonase, sinus rinses  Supportive, symptomatic treatment

## 2024-09-17 NOTE — PROGRESS NOTES
Established Patient    Iveth Daley is a 63 y.o. female who presents today with the following:    CC:   Chief Complaint   Patient presents with    Follow-Up     Pt report she has not gotten her labs done    Coronavirus Screening     Pt report she has been feeling stronger, no longer taking med prescribed       HPI:     Recent COVID-19 infection (9/10/24), gradually improving  Did not tolerate Paxlovid due to GI  Poor appetite  Change in taste  No fever, chills,   Still congested, trial of flonase, sinus rinses  Supportive, symptomatic treatment    Chronic abdominal pain, pending CT  Better with stretching  Heartburn is controlled with 20 mg nightly  Has right flank discomfort as well  Denies fever, chills, nausea, vomiting, diarrhea  Hx of cholecystectomy    History of Present Illness      Problem   Covid-19 Virus Infection    Recent COVID-19 infection (9/10/24), gradually improving  Did not tolerate Paxlovid due to GI  Poor appetite  Change in taste  No fever, chills,   Still congested, trial of flonase, sinus rinses  Supportive, symptomatic treatment          Current Outpatient Medications   Medication Sig    Nirmatrelvir&Ritonavir 300/100 20 x 150 MG & 10 x 100MG Tablet Therapy Pack Take 300 mg nirmatrelvir (two 150 mg tablets) with 100 mg ritonavir (one 100 mg tablet) by mouth, with all three tablets taken together twice daily for 5 days.    HYDROcodone-acetaminophen (NORCO) 5-325 MG Tab per tablet TAKE 1 TABLET BY MOUTH EVERY 6 HOURS AS NEEDED FOR PAIN M54.16 7 DAYS SUPPLY    citalopram (CELEXA) 10 MG tablet Take 1 Tablet by mouth every evening for 90 days.    propranolol (INDERAL) 10 MG Tab TAKE 1 TABLET BY MOUTH 2 TIMES A DAY    famotidine (PEPCID) 20 MG Tab Take 1 Tablet by mouth every evening.    polyethylene glycol/lytes (MIRALAX) Pack Take 17 g by mouth every day.     Allergies   Allergen Reactions    Codeine Shortness of Breath    Sulfa Drugs Rash       Allergies, past medical history, past  "surgical history, medications, family history, social history reviewed and updated.    ROS Please see HPI    Physical Exam  Vitals: /70 (BP Location: Left arm, Patient Position: Sitting, BP Cuff Size: Adult)   Pulse 84   Temp 36.9 °C (98.4 °F) (Temporal)   Resp 18   Ht 1.6 m (5' 3\")   Wt 97 kg (213 lb 12.8 oz)   SpO2 96%   BMI 37.87 kg/m²   Physical Exam  Constitutional:       Appearance: Normal appearance.   HENT:      Head: Normocephalic and atraumatic.      Right Ear: External ear normal.      Left Ear: External ear normal.      Nose: Nose normal.      Mouth/Throat:      Pharynx: Oropharynx is clear.   Cardiovascular:      Rate and Rhythm: Normal rate and regular rhythm.      Pulses: Normal pulses.   Pulmonary:      Effort: Pulmonary effort is normal.      Breath sounds: Normal breath sounds.   Abdominal:      General: Bowel sounds are normal.      Palpations: Abdomen is soft.      Tenderness: There is abdominal tenderness. There is no right CVA tenderness, left CVA tenderness, guarding or rebound.      Comments: RUQ tenderness   Musculoskeletal:      Cervical back: Neck supple.      Right lower leg: No edema.      Left lower leg: No edema.   Skin:     General: Skin is warm and dry.   Neurological:      Mental Status: She is alert. Mental status is at baseline.   Psychiatric:         Mood and Affect: Mood normal.         Behavior: Behavior normal.         Thought Content: Thought content normal.         Judgment: Judgment normal.         Labs (8/16/24) were reviewed and discussed with patients.  All questions were answered.      Assessment and Plan    1. Abdominal pain, unspecified abdominal location  - CBC WITH DIFFERENTIAL; Future  - Comp Metabolic Panel; Future  - LIPASE; Future  Pending imaging test  Supportive  ER precaution discussed    2. COVID-19 virus infection  Recent COVID-19 infection (9/10/24), gradually improving  Did not tolerate Paxlovid due to GI  Poor appetite  Change in taste  No " fever, chills,   Still congested, trial of flonase, sinus rinses  Supportive, symptomatic treatment  --Increase fluid intake, rest.  --Nasal irrigation or a Neti-pot if you have sinus congestion  --Humidifier in room or hot shower/bath.  --warm salt water gargles for sore throat  --Tylenol as needed for aches and pain, fever.  --Robitussin DM as needed for cough and thick phlegm  --Intranasal steroids (Flonase, prescription or over-the-counter) for sinus congestion  --Saline nasal spray  -- Hot tea + honey + fresh lemon juice  -- Honey by itself has been shown to help provide cough relief  --Prevention: regular handwashing, facemask, social distancing, cover cough, avoid immunocompromised patients, and stay at home.  -- advised to monitor pulse oximetry at home and to maintain O2 sat 90% or above  -- strict ED precautions if worsening mental status, chest pain, shortness of breath, lack of urination      Follow-up:Return in about 1 month (around 10/17/2024), or if symptoms worsen or fail to improve.    This note was created using voice recognition software. There may be unintended errors in spelling, grammar or content.

## 2024-09-19 ENCOUNTER — HOSPITAL ENCOUNTER (OUTPATIENT)
Dept: LAB | Facility: MEDICAL CENTER | Age: 64
End: 2024-09-19
Attending: INTERNAL MEDICINE
Payer: MEDICARE

## 2024-09-19 DIAGNOSIS — R10.9 ABDOMINAL PAIN, UNSPECIFIED ABDOMINAL LOCATION: ICD-10-CM

## 2024-09-19 LAB
BASOPHILS # BLD AUTO: 0.5 % (ref 0–1.8)
BASOPHILS # BLD: 0.03 K/UL (ref 0–0.12)
EOSINOPHIL # BLD AUTO: 0.04 K/UL (ref 0–0.51)
EOSINOPHIL NFR BLD: 0.6 % (ref 0–6.9)
ERYTHROCYTE [DISTWIDTH] IN BLOOD BY AUTOMATED COUNT: 48.1 FL (ref 35.9–50)
HCT VFR BLD AUTO: 46.7 % (ref 37–47)
HGB BLD-MCNC: 15.4 G/DL (ref 12–16)
IMM GRANULOCYTES # BLD AUTO: 0.02 K/UL (ref 0–0.11)
IMM GRANULOCYTES NFR BLD AUTO: 0.3 % (ref 0–0.9)
LYMPHOCYTES # BLD AUTO: 2.47 K/UL (ref 1–4.8)
LYMPHOCYTES NFR BLD: 37.4 % (ref 22–41)
MCH RBC QN AUTO: 31.6 PG (ref 27–33)
MCHC RBC AUTO-ENTMCNC: 33 G/DL (ref 32.2–35.5)
MCV RBC AUTO: 95.9 FL (ref 81.4–97.8)
MONOCYTES # BLD AUTO: 0.74 K/UL (ref 0–0.85)
MONOCYTES NFR BLD AUTO: 11.2 % (ref 0–13.4)
NEUTROPHILS # BLD AUTO: 3.3 K/UL (ref 1.82–7.42)
NEUTROPHILS NFR BLD: 50 % (ref 44–72)
NRBC # BLD AUTO: 0 K/UL
NRBC BLD-RTO: 0 /100 WBC (ref 0–0.2)
PLATELET # BLD AUTO: 325 K/UL (ref 164–446)
PMV BLD AUTO: 10 FL (ref 9–12.9)
RBC # BLD AUTO: 4.87 M/UL (ref 4.2–5.4)
WBC # BLD AUTO: 6.6 K/UL (ref 4.8–10.8)

## 2024-09-19 PROCEDURE — 83690 ASSAY OF LIPASE: CPT

## 2024-09-19 PROCEDURE — 80053 COMPREHEN METABOLIC PANEL: CPT

## 2024-09-19 PROCEDURE — 36415 COLL VENOUS BLD VENIPUNCTURE: CPT

## 2024-09-19 PROCEDURE — 85025 COMPLETE CBC W/AUTO DIFF WBC: CPT

## 2024-09-21 LAB
ALBUMIN SERPL BCP-MCNC: 4 G/DL (ref 3.2–4.9)
ALBUMIN/GLOB SERPL: 1.5 G/DL
ALP SERPL-CCNC: 70 U/L (ref 30–99)
ALT SERPL-CCNC: 13 U/L (ref 2–50)
ANION GAP SERPL CALC-SCNC: 16 MMOL/L (ref 7–16)
AST SERPL-CCNC: 21 U/L (ref 12–45)
BILIRUB SERPL-MCNC: 0.5 MG/DL (ref 0.1–1.5)
BUN SERPL-MCNC: 6 MG/DL (ref 8–22)
CALCIUM ALBUM COR SERPL-MCNC: 9.2 MG/DL (ref 8.5–10.5)
CALCIUM SERPL-MCNC: 9.2 MG/DL (ref 8.5–10.5)
CHLORIDE SERPL-SCNC: 104 MMOL/L (ref 96–112)
CO2 SERPL-SCNC: 20 MMOL/L (ref 20–33)
CREAT SERPL-MCNC: 0.65 MG/DL (ref 0.5–1.4)
GFR SERPLBLD CREATININE-BSD FMLA CKD-EPI: 97 ML/MIN/1.73 M 2
GLOBULIN SER CALC-MCNC: 2.6 G/DL (ref 1.9–3.5)
GLUCOSE SERPL-MCNC: 94 MG/DL (ref 65–99)
LIPASE SERPL-CCNC: 30 U/L (ref 11–82)
POTASSIUM SERPL-SCNC: 4.6 MMOL/L (ref 3.6–5.5)
PROT SERPL-MCNC: 6.6 G/DL (ref 6–8.2)
SODIUM SERPL-SCNC: 140 MMOL/L (ref 135–145)

## 2024-10-11 ENCOUNTER — APPOINTMENT (OUTPATIENT)
Dept: SLEEP MEDICINE | Facility: MEDICAL CENTER | Age: 64
End: 2024-10-11
Attending: PHYSICIAN ASSISTANT
Payer: MEDICARE

## 2024-10-17 ENCOUNTER — APPOINTMENT (OUTPATIENT)
Dept: MEDICAL GROUP | Facility: IMAGING CENTER | Age: 64
End: 2024-10-17
Payer: MEDICARE

## 2024-10-18 ENCOUNTER — HOSPITAL ENCOUNTER (OUTPATIENT)
Dept: RADIOLOGY | Facility: MEDICAL CENTER | Age: 64
End: 2024-10-18
Attending: INTERNAL MEDICINE
Payer: MEDICARE

## 2024-10-18 DIAGNOSIS — R10.9 RIGHT FLANK PAIN: ICD-10-CM

## 2024-10-18 PROCEDURE — 700117 HCHG RX CONTRAST REV CODE 255: Performed by: INTERNAL MEDICINE

## 2024-10-18 PROCEDURE — 74177 CT ABD & PELVIS W/CONTRAST: CPT

## 2024-10-18 RX ADMIN — IOHEXOL 100 ML: 350 INJECTION, SOLUTION INTRAVENOUS at 15:33

## 2024-10-30 ENCOUNTER — APPOINTMENT (OUTPATIENT)
Dept: BEHAVIORAL HEALTH | Facility: CLINIC | Age: 64
End: 2024-10-30
Payer: MEDICARE

## 2024-11-01 ENCOUNTER — APPOINTMENT (OUTPATIENT)
Dept: MEDICAL GROUP | Facility: IMAGING CENTER | Age: 64
End: 2024-11-01
Payer: MEDICARE

## 2024-11-01 VITALS
DIASTOLIC BLOOD PRESSURE: 62 MMHG | TEMPERATURE: 98 F | RESPIRATION RATE: 20 BRPM | WEIGHT: 214.4 LBS | HEART RATE: 82 BPM | BODY MASS INDEX: 37.99 KG/M2 | SYSTOLIC BLOOD PRESSURE: 118 MMHG | HEIGHT: 63 IN | OXYGEN SATURATION: 96 %

## 2024-11-01 DIAGNOSIS — Z23 NEED FOR VACCINATION: ICD-10-CM

## 2024-11-01 DIAGNOSIS — K21.9 GASTROESOPHAGEAL REFLUX DISEASE WITHOUT ESOPHAGITIS: ICD-10-CM

## 2024-11-01 DIAGNOSIS — R32 URINARY INCONTINENCE, UNSPECIFIED TYPE: ICD-10-CM

## 2024-11-01 PROCEDURE — G0008 ADMIN INFLUENZA VIRUS VAC: HCPCS | Performed by: INTERNAL MEDICINE

## 2024-11-01 PROCEDURE — 3078F DIAST BP <80 MM HG: CPT | Performed by: INTERNAL MEDICINE

## 2024-11-01 PROCEDURE — 99214 OFFICE O/P EST MOD 30 MIN: CPT | Mod: 25 | Performed by: INTERNAL MEDICINE

## 2024-11-01 PROCEDURE — 3074F SYST BP LT 130 MM HG: CPT | Performed by: INTERNAL MEDICINE

## 2024-11-01 PROCEDURE — 90662 IIV NO PRSV INCREASED AG IM: CPT | Performed by: INTERNAL MEDICINE

## 2024-11-01 RX ORDER — LORAZEPAM 0.5 MG/1
0.5 TABLET ORAL PRN
COMMUNITY
Start: 2024-10-11 | End: 2024-11-21 | Stop reason: SDUPTHER

## 2024-11-01 RX ORDER — CITALOPRAM HYDROBROMIDE 10 MG/1
10 TABLET ORAL EVERY EVENING
COMMUNITY
Start: 2024-09-28 | End: 2024-11-21 | Stop reason: SDUPTHER

## 2024-11-01 ASSESSMENT — FIBROSIS 4 INDEX: FIB4 SCORE: 1.16

## 2024-11-01 NOTE — PROGRESS NOTES
"Established Patient    Iveth Daley is a 63 y.o. female who presents today with the following:    CC:   Chief Complaint   Patient presents with    Lab Results     Labs 9/19, ct 10/18    Follow-Up     CT scan 10/18       HPI:           Problem   Urinary Incontinence    The patient experiences urinary incontinence with laughing, coughing, or sneezing. She has been performing Kegel exercises without significant improvement. Recommend referral to urogynecology for further evaluation.     Gastroesophageal Reflux Disease Without Esophagitis    Heartburn is controlled  Famotidine 20 mg nightly, she will try to taper   Still has intermittent upper abdominal bloating, Gas X as needed for excessive gas.   GI referral             Current Outpatient Medications   Medication Sig    citalopram (CELEXA) 10 MG tablet Take 10 mg by mouth every evening.    LORazepam (ATIVAN) 0.5 MG Tab 0.5 mg as needed.    famotidine (PEPCID) 20 MG Tab Take 1 Tablet by mouth every evening.    polyethylene glycol/lytes (MIRALAX) Pack Take 17 g by mouth every day.    HYDROcodone-acetaminophen (NORCO) 5-325 MG Tab per tablet TAKE 1 TABLET BY MOUTH EVERY 6 HOURS AS NEEDED FOR PAIN M54.16 7 DAYS SUPPLY     Allergies   Allergen Reactions    Codeine Shortness of Breath    Sulfa Drugs Rash       Allergies, past medical history, past surgical history, medications, family history, social history reviewed and updated.    ROS Please see HPI    Physical Exam  Vitals: /62 (BP Location: Right arm, Patient Position: Sitting, BP Cuff Size: Adult)   Pulse 82   Temp 36.7 °C (98 °F) (Temporal)   Resp 20   Ht 1.6 m (5' 3\")   Wt 97.3 kg (214 lb 6.4 oz)   SpO2 96%   BMI 37.98 kg/m²   Physical Exam  Constitutional:       Appearance: Normal appearance.   HENT:      Head: Normocephalic and atraumatic.      Right Ear: External ear normal.      Left Ear: External ear normal.      Nose: Nose normal.      Mouth/Throat:      Pharynx: Oropharynx is clear. "   Cardiovascular:      Rate and Rhythm: Normal rate and regular rhythm.      Pulses: Normal pulses.   Pulmonary:      Effort: Pulmonary effort is normal.      Breath sounds: Normal breath sounds.   Abdominal:      General: Bowel sounds are normal.      Palpations: Abdomen is soft.      Tenderness: There is no abdominal tenderness.   Musculoskeletal:      Cervical back: Neck supple.      Right lower leg: No edema.      Left lower leg: No edema.   Skin:     General: Skin is warm and dry.   Neurological:      Mental Status: She is alert. Mental status is at baseline.   Psychiatric:         Mood and Affect: Mood normal.         Behavior: Behavior normal.         Thought Content: Thought content normal.         Judgment: Judgment normal.         Imaging (10/18/24) was reviewed.  All questions were answered.      Assessment and Plan    1. Urinary incontinence, unspecified type  - Referral to Urogynecology  Kegel exercises    The patient experiences urinary incontinence with laughing, coughing, or sneezing. She has been performing Kegel exercises without significant improvement. Recommend referral to urogynecology for further evaluation.    2. Need for vaccination  - INFLUENZA VACCINE, HIGH DOSE (65+ ONLY)    3. Gastroesophageal reflux disease without esophagitis  - Referral to Gastroenterology  Heartburn is controlled  Famotidine 20 mg nightly, she will try to taper   Still has intermittent upper abdominal bloating, Gas X as needed for excessive gas.   GI referral           Follow-up:Return in about 3 months (around 2/1/2025), or if symptoms worsen or fail to improve.    This note was created using voice recognition software. There may be unintended errors in spelling, grammar or content.

## 2024-11-01 NOTE — ASSESSMENT & PLAN NOTE
The patient experiences urinary incontinence with laughing, coughing, or sneezing. She has been performing Kegel exercises without significant improvement. Recommend referral to urogynecology for further evaluation.

## 2024-11-01 NOTE — ASSESSMENT & PLAN NOTE
Heartburn is controlled  Famotidine 20 mg nightly, she will try to taper   Still has intermittent upper abdominal bloating, Gas X as needed for excessive gas.   GI referral

## 2024-11-12 ENCOUNTER — TELEPHONE (OUTPATIENT)
Dept: OCCUPATIONAL THERAPY | Facility: REHABILITATION | Age: 64
End: 2024-11-12
Payer: MEDICARE

## 2024-11-12 NOTE — OP THERAPY DISCHARGE SUMMARY
Outpatient Occupational Therapy  DISCHARGE SUMMARY NOTE    [facility] [address]  [address]  [place] [address]  Phone:  [phone]  Fax:  [phone]    Date of Visit: 11/12/2024    Patient: Iveth Daley  YOB: 1960  MRN: [id number]     Referring Provider: Abhinav Jones M.D.  [address] [place] [address] [place] [address]   Referring Diagnosis Pain in wrist, unspecified laterality [M25.539];Pain of hand, unspecified laterality [M79.643]         Your patient is being discharged from Occupational Therapy with the following comments:   Patient has failed to schedule or reschedule follow-up visits    Comments:  Patient has not been seen since March, no follow ups scheduled, will d/c from OT services.      Brittney Bertrand MS,OTR/L    Date: 11/12/2024

## 2024-11-14 ENCOUNTER — APPOINTMENT (OUTPATIENT)
Dept: BEHAVIORAL HEALTH | Facility: CLINIC | Age: 64
End: 2024-11-14
Payer: MEDICARE

## 2024-11-21 ENCOUNTER — OFFICE VISIT (OUTPATIENT)
Dept: BEHAVIORAL HEALTH | Facility: CLINIC | Age: 64
End: 2024-11-21
Payer: MEDICARE

## 2024-11-21 DIAGNOSIS — F33.1 MAJOR DEPRESSIVE DISORDER, RECURRENT EPISODE, MODERATE (HCC): ICD-10-CM

## 2024-11-21 DIAGNOSIS — F41.1 GENERALIZED ANXIETY DISORDER: ICD-10-CM

## 2024-11-21 PROCEDURE — 99214 OFFICE O/P EST MOD 30 MIN: CPT | Performed by: PSYCHIATRY & NEUROLOGY

## 2024-11-21 RX ORDER — LORAZEPAM 0.5 MG/1
0.5 TABLET ORAL 2 TIMES DAILY
Qty: 60 TABLET | Refills: 0 | Status: SHIPPED | OUTPATIENT
Start: 2024-11-21 | End: 2024-12-21

## 2024-11-21 RX ORDER — CITALOPRAM HYDROBROMIDE 10 MG/1
10 TABLET ORAL EVERY EVENING
Qty: 90 TABLET | Refills: 0 | Status: SHIPPED | OUTPATIENT
Start: 2024-11-21 | End: 2025-02-19

## 2024-11-21 NOTE — PROGRESS NOTES
[facility]   Follow Up Assessment     This provider informed the patient their medical records are totally confidential except for the use by other providers involved in their care, or if the patient signs a release, or to report instances of child or elder abuse, or if it is determined they are an immediate risk to harm themselves or others.    Name: Iveth Daley  MRN: [id number]  : 1960  Age: 63 y.o.  Date of assessment: 2024  PCP: Abhinav Jones M.D.      Subjective:  Chart reviewed prior to seeing her in my office.  She was last seen on .  We reviewed her current medication combination.  She prefers to continue Celexa 10 mg at bedtime and uses Ativan 0.5 mg twice daily as needed.  She got COVID in September.  She continues to enjoy swimming indoors.    Objective:  She is alert, oriented, and cooperative.  Relatedness is good.  Grooming is good.  Speech is normal rate.  Anxious.  Memory is good.  Insight and judgment are good.  No indication of psychotic thinking.    Current Risk:       Suicidal: Not suicidal       Homicidal: Not homicidal       Self-Harm: No plan to harm self       Relapse: (Low/Moderate/High): Moderate       Crisis Safety Plan Reviewed: Discussed with patient    Diagnosis:   Major depressive disorder, recurrent  Generalized anxiety disorder    Treatment Plan:  The current treatment plan consists of quarterly psychiatric sessions designed to evaluate her depression and anxiety.    Duration will be for a minimum of 12 months and will be reviewed at each visit.    Goals: Remission of depression with control of anxiety in order to prevent relapse due to the chronic nature of her behavioral health problems and mental illness.  Continue Celexa 10 mg at bedtime.  Use Ativan 0.5 mg twice daily as needed.    Hugo Cartagena M.D.      This note was created using voice recognition software (Dragon). The accuracy of the dictation is limited by the  abilities of the software. I have reviewed the note prior to signing, however some errors in grammar and context are still possible. If you have any questions related to this note please do not hesitate to contact our office.

## 2024-12-11 ENCOUNTER — GYNECOLOGY VISIT (OUTPATIENT)
Dept: GYNECOLOGY | Facility: CLINIC | Age: 64
End: 2024-12-11
Payer: MEDICARE

## 2024-12-11 VITALS
BODY MASS INDEX: 37.18 KG/M2 | HEIGHT: 64 IN | WEIGHT: 217.8 LBS | DIASTOLIC BLOOD PRESSURE: 78 MMHG | HEART RATE: 82 BPM | SYSTOLIC BLOOD PRESSURE: 122 MMHG

## 2024-12-11 DIAGNOSIS — G47.33 OSA (OBSTRUCTIVE SLEEP APNEA): ICD-10-CM

## 2024-12-11 DIAGNOSIS — R39.15 URGENCY OF URINATION: ICD-10-CM

## 2024-12-11 DIAGNOSIS — N39.3 SUI (STRESS URINARY INCONTINENCE, FEMALE): ICD-10-CM

## 2024-12-11 LAB
APPEARANCE UR: NORMAL
BILIRUB UR STRIP-MCNC: NEGATIVE MG/DL
COLOR UR AUTO: NORMAL
GLUCOSE UR STRIP.AUTO-MCNC: NEGATIVE MG/DL
KETONES UR STRIP.AUTO-MCNC: NEGATIVE MG/DL
LEUKOCYTE ESTERASE UR QL STRIP.AUTO: NEGATIVE
NITRITE UR QL STRIP.AUTO: NEGATIVE
PH UR STRIP.AUTO: 7 [PH] (ref 5–8)
PROT UR QL STRIP: NEGATIVE MG/DL
RBC UR QL AUTO: NEGATIVE
SP GR UR STRIP.AUTO: 1.01
UROBILINOGEN UR STRIP-MCNC: 0.2 MG/DL

## 2024-12-11 PROCEDURE — 99204 OFFICE O/P NEW MOD 45 MIN: CPT | Performed by: STUDENT IN AN ORGANIZED HEALTH CARE EDUCATION/TRAINING PROGRAM

## 2024-12-11 PROCEDURE — 81002 URINALYSIS NONAUTO W/O SCOPE: CPT | Performed by: STUDENT IN AN ORGANIZED HEALTH CARE EDUCATION/TRAINING PROGRAM

## 2024-12-11 ASSESSMENT — FIBROSIS 4 INDEX: FIB4 SCORE: 1.16

## 2024-12-11 NOTE — PROGRESS NOTES
Urogynecology & Reconstructive Pelvic Surgery - Consultation Visit    Iveth Daley MRN:[id number] :1960    Referred by: Dr. Abhinav Jones    Reason for Visit:   Chief Complaint   Patient presents with    New Patient     Ref for urge incontinence unspecified         Subjective     History of Presenting Illness:  Iveth Daley is a 63 y.o. P0 with GERD, anxiety, depression, constipation, scoliosis, spinal stenosis who presents for the evaluation and management of urinary incontinence.     Reports leaking urine with laugh, cough, sneeze. Does Kegels without any improvement. Has to wear a pad daily now which is annoying. Also rarely leaks with urge trying to get the bathroom, most noticeable at night. She is waking up due to her chronic pain and then happens to go to the bathroom while awake.     Has chronic RLE/R hip pain for the last 3y. Working on strengthening muscles and muscle relaxation massages.  Has Norco available but primarily tylenol as needed. Has lost 10-lb sine this summer.     Has constipation with h/o diverticulitis s/p colon resection. Since then reports issues with leakage.     Had a sleep study done: mild- moderate KARYN. Tried a mask and cannot sleep with it. Elevated the head of her bed and lost weight     Just moved from [place] to be closer to her niece and her family.     Prior Pelvic surgery:   Lois   Colon resection - diverticulitis      Prior treatment:   Kegels    Fluid intake:   Water: 32-48oz, 1 carbonated water  Coffee: 2 cups 1/2 caf   Tea: 1 cup (1/2 ice team herbal)  Juice: not daily     Pelvic floor symptom review:     Bladder:   Voids per day: 3 Voids per night: 3 (gets up due to pain and then happens to pee)   Urinary incontinence episodes per day: 4    Urge leakage:  On Movement to Bathroom   Stress leakage: With Cough, With Laugh, With Bending/Squatting, and Upon Standing   Continuous / insensible urine loss: No    Nocturnal enuresis: No     Leakage volume: Drops   Number of pads/day: 1-2    Bladder emptying: Complete   Voiding symptoms: Post-Void Dribble   UTI in last 12 months: 0   Other urologic history: h/o kidney stones       Prolapse:     Prolapse symptoms: None   Degree of prolapse: n/a   Duration of prolapse symptoms: n/a      Bowel:    Constipation: No    Bowel movements per day: 2x , stool quality: smooth log    Straining to empty bowels: No    Splinting to evacuate: No    Painful evacuation: No    Difficulty emptying rectum: No    Incontinence to stool: No    Blood in stool: No    Hemorrhoids: No    Bowel conditions: Diverticulosis and Diverticulitis   Most recent colonoscopy:        Sexual function:    Sexually active: No lack of partner, interested    Gender of partners: Male   Pain with intercourse: No   History of abuse: No        Pelvic Pain: None      Past medical and surgical history    Past obstetric history   Number of vaginal deliveries: 0   Number of  deliveries: 0   History of vacuum/forceps: No    History of obstetric anal sphincter injury: No     Past gynecological history:    Last menstrual period/Menopause: No LMP recorded. Patient is postmenopausal.   History of abnormal uterine bleeding: Yes   History of fibroids: No    History of endometrial polyps:  No    History of endometriosis: No    History of cervical dysplasia: No    Last pap:  wnl    Current contraception: none      Past medical history:  Past Medical History:   Diagnosis Date    Wrist fracture 05/10/2023    Right wrist broken from a fall    Diverticulitis of ascending colon     abscess in the colon    Anxiety     Back pain     Back pain     Bronchitis     Constipation     Depression     Fatigue     GERD (gastroesophageal reflux disease)     Hearing difficulty     Obesity     Painful joint     Pneumonia     Weakness     Wears glasses      Past surgical history:  Past Surgical History:   Procedure Laterality Date    CARPAL TUNNEL RELEASE       "CHOLECYSTECTOMY      2021    COLON RESECTION       Medications:has a current medication list which includes the following prescription(s): citalopram, lorazepam, hydrocodone-acetaminophen, famotidine, and polyethylene glycol/lytes.  Allergies:Codeine and Sulfa drugs  Family history:  Family History   Problem Relation Age of Onset    Stroke Mother     Parkinson's Disease Father     Cancer Father         Parkinson’s disease    Cancer Sister         MM    No Known Problems Brother     Ovarian Cancer Neg Hx     Tubal Cancer Neg Hx     Peritoneal Cancer Neg Hx     Colorectal Cancer Neg Hx     Breast Cancer Neg Hx     Bilateral Breast Cancer Neg Hx     BRCA 1 Neg Hx     BRCA 1 Carrier  Neg Hx     BRCA 2 Neg Hx     BRCA 2 Carrier Neg Hx      Social history: reports that she quit smoking about 43 years ago. Her smoking use included cigarettes. She started smoking about 45 years ago. She has been exposed to tobacco smoke. She has never used smokeless tobacco. She reports current alcohol use of about 0.6 oz of alcohol per week. She reports that she does not currently use drugs after having used the following drugs: Marijuana. Frequency: 2.00 times per week.    Review of systems: A full review of systems was performed, and negative with the exception of want is noted above in the HPI.        Objective        /78 (BP Location: Left arm, Patient Position: Sitting, BP Cuff Size: Large adult)   Pulse 82   Ht 5' 3.5\"   Wt 217 lb 12.8 oz   BMI 37.98 kg/m²     Physical Exam  Constitutional:       Appearance: Normal appearance. She is obese.   HENT:      Head: Normocephalic.      Nose: Nose normal.   Eyes:      Pupils: Pupils are equal, round, and reactive to light.   Cardiovascular:      Comments: Normotensive  Pulmonary:      Effort: Pulmonary effort is normal.   Abdominal:      Palpations: Abdomen is soft.   Musculoskeletal:         General: Normal range of motion.      Cervical back: Normal range of motion.   Skin:     " General: Skin is warm.   Neurological:      General: No focal deficit present.      Mental Status: She is alert.   Psychiatric:         Mood and Affect: Mood normal.        Genitourinary:    Vulva: WNL   Bulbocavernosus reflex: Intact   Anal wink reflex: Intact   Perineal sensation: WNL   Urethra: WNL   Vagina: WNL   Atrophy: None   Cough stress test: Positive    Chaperone was present throughout the physical exam.     Pelvic floor:    POP-Q deferred, no prolapse on exam    Cervical elongation: No    Urethral tenderness: No    Bladder/ suprapubic tenderness: No    Levator tenderness: None   Levator muscle tone: WNL   Pelvic floor contraction strength (modified Oxford scale): 2=Weak   Pelvic floor contraction duration: Normal   Bimanual exam: Limited Exam Due to Body Habitus   Rectal: deferred   Vaginal band/stricture: No     Procedure Performed: No    Chaperone was present throughout the physical exam.     Diagnostic test and records review:    Urine dipstick:   Lab Results   Component Value Date/Time    POCCOLOR Light Yellow 12/11/2024 03:00 PM    POCAPPEAR Turbid 12/11/2024 03:00 PM    POCLEUKEST Negative 12/11/2024 03:00 PM    POCNITRITE Negative 12/11/2024 03:00 PM    POCUROBILIGE 0.2 12/11/2024 03:00 PM    POCPROTEIN Negative 12/11/2024 03:00 PM    POCURPH 7.0 12/11/2024 03:00 PM    POCBLOOD Negative 12/11/2024 03:00 PM    POCSPGRV 1.015 12/11/2024 03:00 PM    POCKETONES Negative 12/11/2024 03:00 PM    POCBILIRUBIN Negative 12/11/2024 03:00 PM    POCGLUCUA Negative 12/11/2024 03:00 PM         Post-void residual: 20 mL, performed by Bladder Scanner    Labs:     Lab Results   Component Value Date/Time    SODIUM 140 09/19/2024 0800    POTASSIUM 4.6 09/19/2024 0800    CHLORIDE 104 09/19/2024 0800    CO2 20 09/19/2024 0800    GLUCOSE 94 09/19/2024 0800    BUN 6 (L) 09/19/2024 0800    CREATININE 0.65 09/19/2024 0800    CALCIUM 9.2 09/19/2024 0800    ANION 16.0 09/19/2024 0800       Lab Results   Component Value  Date/Time    WBC 6.6 09/19/2024 08:00 AM    RBC 4.87 09/19/2024 08:00 AM    HEMOGLOBIN 15.4 09/19/2024 08:00 AM    MCV 95.9 09/19/2024 08:00 AM    MCH 31.6 09/19/2024 08:00 AM    MCHC 33.0 09/19/2024 08:00 AM    RDW 48.1 09/19/2024 08:00 AM    MPV 10.0 09/19/2024 08:00 AM         Radiology: None    Procedural: None    Documentation reviewed: Prior EMR Records    Outside records reviewed: 0 pages      Assessment & Plan     Iveth Daley is a 63 y.o. P0 with JOSE ENRIQUE, KARYN. We discussed my recommendations for further diagnosis and treatment at length today.     Assessment & Plan  JOSE ENRIQUE (stress urinary incontinence, female)  She reports stress urinary incontinence (JOSE ENRIQUE) symptoms. Cough stress test today was positive. The pathogenesis of JOSE ENRIQUE includes genetic tendency,  aging, menopause and childbirth injuries, and is overall caused by a weakness of the pelvic support of the urethra, and thus interventions to fix this enhance the structure either via behavioral/physical therapy or through surgical intervention.  I discussed options for management which include both nonsurgical and surgical options.   - We discussed non-surgical options including pelvic floor physical therapy and pessary use.  I discussed different types of pessary that may be used for stress urinary incontinence as well as pessary care.    - Procedural interventions aimed to strengthen the urethral support or the urethral opening, and there are various techniques that are tailored to each patient's symptoms and bladder function  A midurethral sling is the gold standard treatment for most stress urinary incontinence, especially when the urethra is hypermobile/unsupported.  This involves the placement of a safe, light weight piece of mesh under the urethra to help support it back into the normal anatomic location.  This is an outpatient vaginal surgery with same-day discharge and no need for narcotic pain medications.  While not the most painful procedure,  there are restrictions on activity for 6 weeks afterwards including vigorous exercise, heavy lifting, straining, intercourse, sitting in water/swimming. Approximately 90% of patients experience of significant improvement in their leakage symptoms after sling procedure, and 60 to 70% report a complete resolution of her urinary leakage. Patients with urgency may also see improvement, but this may also persist or worsen due to different underlying causes of urinary urgency.  Approximately 20-30% of patients may require a temporary Basilio catheter after this procedure, and 1/50 patients may need an adjustment to loosen the sling in the immediate postoperative period due to overtightening.  Mesh is considered overwhelmingly safe and has been extensively studied, but there is an approximately 1 to 3% chance long-term of a mesh complication, the majority of which are small mesh exposures which can be managed either through vaginal estrogen or excision of mesh.  Major complications are rare.   A urethral bulking procedure using Bulkamid is an alternative therapy for stress urinary incontinence. This does not use any permanent implanted materials, but employed as a safe hydrogel which allows ingrowth of the patient's own tissue to strengthen the opening of the bladder into the urethra. This is also a same-day procedure without any postoperative restrictions. While less invasive, success rates are lower when compared to the sling, with approximately 60% of patients seeing a dramatic improvement in their symptoms, 90% of patients seeing some improvement. 1/4 patients require 2 treatments in order to get optimal therapeutic results. There is a 10 to 20% chance of needing a temporary Basilio catheter for 1-2 days after the procedure.  At this time patient would like to pursue PFPT. Referral sent.   FU as needed   Orders:    POCT Urinalysis    Referral to Physical Therapy    Urgency of urination  KARYN (obstructive sleep apnea)  This  patient has overactive bladder; She was educated on the pathophysiology of bladder urgency, and that her symptoms are likely due to overactivity of the bladder muscle and nerves. Conservative/behavioral management strategies were reviewed, including fluid management, avoidance of bladder irritants, urge strategies and Kegel's exercises. Moderate weight loss in obese patients (5-8%) can lead to significant urinary symptom improvement. Overview of pelvic floor physical therapy, biofeedback, and second-line oral medical therapy (anticholinergics, beta-3 agonists) and third-line therapies (intravesical botox injection, PTNS, sacral neuromodulation) discussed.   - Most of her night waking is due to chronic pain, and then she voids because she is awake. Thus do not think this is all true nocturia. Pt has rare urgency and voids infrequently. Thus I do not think medications, botox or nerve stimulation will be of much benefit to her, and may cause more urinary retention. Recommend behavioral and diet changes.   - Discussed how untreated KARYN can lead to nocturia and recommend treatment of this to improve her nocturia.   - If she develops bothersome urgency, frequency or urge leakage, instructed to follow up in clinic.          Medical decision making (MDM)  45 minutes total time spent on the date of the encounter:    2 min reviewing records  10 min with the history and physical exam   25 min  spent in direct patient education and counseling   3 min spent in the coordination of care  5 min spent in electronic medical record documentation in the patient's chart.    Melanie Frost MD  Urogynecology and Reconstructive Pelvic Surgery  Department of Obstetrics and Gynecology  [facility]

## 2024-12-11 NOTE — PROGRESS NOTES
New Patient to Establish    Reason to establish: New patient to establish    Iveth Daley is a 62 y.o. female who presents today with the following:    CC:   Chief Complaint   Patient presents with    Establish Care     Prior PCP- Pt states none. Moved from [place]     Referral Needed     Ortho- pt had a fall in May and broke her right wrist and for back pain. Currently on disability. Pt would like to get PT.        HPI: she is here to establish care today. She used to live in [place] and recently moved to [place].     Problem   At Risk for Sleep Apnea    Snoring loud, daytime fatigue, non-refreshing sleep, wake up a few night   Body mass index is 38.74 kg/m².        Depression    Recurrent Depression, denies SI/HI  Zoloft 25 mg daily  Referral to psychiatry     Mood Swings    On depakote ER 1500 mg daily per her previous psychiatrist for bipolar like symptoms.   She would like to see psychiatry     Fine Tremor    Patient reported she gets fine tremors taking Depakote, so she was put on propranolol to control the fine tremors  On propranolol 10-20 mg BID prn     Closed Fracture of Distal End of Right Radius With Routine Healing    7/7/2023 right wrist x ray: stable alignment of the distal radius fractures. No complicating features.    She had GLF and had right radius fractures in [place] in May 2023. She did occupational therapy.         Referral orthopedics     Chronic Right Hip Pain    Chronic right hip pain, she was seeing orthopedics and doing PT in [place]     Acute Non-Recurrent Maxillary Sinusitis    Sinus congestion, runny nose, cough, yellow sputum since 12/5/2023    Denies fever, chest pain, SOB  Taking DayQuil and night Quil, steamy shower, drinking water, honey tea, lemon  She tested herself at home, Covid-19 negative               Current Outpatient Medications:     busPIRone (BUSPAR) 5 MG tablet, Take 5 mg by mouth every day., Disp: , Rfl:     propranolol (INDERAL) 10 MG Tab, Take 10-20 mg by  Subjective     Tricia Grande is a 1 y.o. female here with patient and mother. Patient brought in for Abdominal Pain (Mom states of 5 days of abdominal pain before vomiting) and Rash (Pt in for rash )      History of Present Illness:  HPI  Pt was seen at [facility] on 12/9 and dx with AGE. Pt was dc home with supportive care and strict return precautions. Pt is here today for ER follow up.     Today, mother reports improvement of vomiting. Last episode of vomiting ~ 12 hours ago, without zofran. She has not complained of abdominal pain as much as she was in the last 12 hours. She is tolerating liquids with small bites of crackers. Pt also had new findings of a rash, which have now resolved.     Review of Systems   Constitutional:  Positive for activity change and appetite change.   Gastrointestinal:  Positive for abdominal pain and vomiting.   Skin:  Positive for rash.          Objective     Physical Exam  Vitals and nursing note reviewed.   Constitutional:       General: She is active.   HENT:      Head: Normocephalic and atraumatic.      Right Ear: Tympanic membrane, ear canal and external ear normal.      Left Ear: Tympanic membrane, ear canal and external ear normal.      Nose: Nose normal.      Mouth/Throat:      Mouth: Mucous membranes are moist.      Pharynx: No oropharyngeal exudate or posterior oropharyngeal erythema.   Eyes:      General: Red reflex is present bilaterally.   Cardiovascular:      Rate and Rhythm: Normal rate and regular rhythm.      Pulses: Normal pulses.      Comments: Cap refill <2 seconds    Pulmonary:      Effort: Pulmonary effort is normal.      Breath sounds: Normal breath sounds.   Abdominal:      General: Abdomen is flat. Bowel sounds are normal. There is no distension.      Palpations: Abdomen is soft. There is no mass.      Tenderness: There is no abdominal tenderness. There is no guarding.   Musculoskeletal:      Cervical back: Normal range of motion.   Skin:      "mouth 2 times a day., Disp: , Rfl:     Ibuprofen (ADVIL MIGRAINE) 200 MG Cap, Take 200 mg by mouth as needed (hip pain PRN)., Disp: , Rfl:     ZOLOFT 25 MG tablet, Take 25 mg by mouth every day. Half a tab in the morning, Disp: , Rfl:     DEPAKOTE  MG TABLET SR 24 HR, Take 6 Tablets by mouth every day., Disp: 30 Tablet, Rfl:     amoxicillin-clavulanate (AUGMENTIN) 875-125 MG Tab, Take 1 Tablet by mouth 2 times a day for 5 days., Disp: 10 Tablet, Rfl: 0    Allergies, past medical history, past surgical history, medications, family history, social history reviewed and updated.    ROS Please see HPI    Physical Exam  /76 (BP Location: Left arm, Patient Position: Sitting, BP Cuff Size: Adult)   Pulse 78   Temp 36.6 °C (97.8 °F) (Temporal)   Resp 16   Ht 1.6 m (5' 3\")   Wt 99.2 kg (218 lb 11.2 oz)   SpO2 98%   BMI 38.74 kg/m²   Physical Exam  Constitutional:       Appearance: Normal appearance.   HENT:      Head: Normocephalic and atraumatic.      Right Ear: External ear normal.      Left Ear: External ear normal.      Nose: Nose normal.      Mouth/Throat:      Pharynx: Oropharynx is clear.   Cardiovascular:      Rate and Rhythm: Normal rate and regular rhythm.      Pulses: Normal pulses.   Pulmonary:      Effort: Pulmonary effort is normal.      Breath sounds: Normal breath sounds.   Abdominal:      General: Bowel sounds are normal.      Palpations: Abdomen is soft.      Tenderness: There is no abdominal tenderness.   Musculoskeletal:      Cervical back: Neck supple.      Right lower leg: No edema.      Left lower leg: No edema.   Skin:     General: Skin is warm and dry.   Neurological:      Mental Status: She is alert. Mental status is at baseline.   Psychiatric:         Mood and Affect: Mood normal.         Behavior: Behavior normal.         Thought Content: Thought content normal.         Judgment: Judgment normal.           Assessment and Plan    1. Mild episode of recurrent major depressive " General: Skin is warm.      Capillary Refill: Capillary refill takes less than 2 seconds.   Neurological:      General: No focal deficit present.      Mental Status: She is alert.            Assessment and Plan     1. Diarrhea, unspecified type    2. Vomiting, unspecified vomiting type, unspecified whether nausea present        Plan:    Tricia was seen today for abdominal pain and rash.    Diagnoses and all orders for this visit:    Diarrhea, unspecified type  -     Gastrointestinal Pathogens Panel, PCR; Future  -     CULTURE, STOOL; Future    Vomiting, unspecified vomiting type, unspecified whether nausea present  -     Gastrointestinal Pathogens Panel, PCR; Future  -     CULTURE, STOOL; Future    Overall, clinically improving with no vomiting ~ 12 hours.     -increase fluid hydration for a goal of 3 wets in 24 hours.   -offer bland diet with smaller more frequent feeds, advancing diet as tolerated.  If symptoms worsen or fail to improve, please call/return to clinic.      Parent/guardian verbalizes an understanding of the plan of care and has been educated on the purpose, side effects, and desired outcomes of any new medications given with today's visit.        Rocio Gonzalez D.O.   [facility]   12/17/2024 9:34 PM          disorder (HCC)  - busPIRone (BUSPAR) 5 MG tablet; Take 5 mg by mouth every day.  - ZOLOFT 25 MG tablet; Take 25 mg by mouth every day.   - Referral to Psychiatry    2. Mood swings  - ZOLOFT 25 MG tablet; Take 25 mg by mouth every day. Half a tab in the morning  - DEPAKOTE  MG TABLET SR 24 HR; Take 6 Tablets by mouth every day.   - Referral to Psychiatry    3. Encounter for medication monitoring  - Referral to Psychiatry  - Comp Metabolic Panel; Future  - AMMONIA; Future  - VALPROIC ACID; Future    4. Fine tremor  - propranolol (INDERAL) 10 MG Tab; Take 10-20 mg by mouth 2 times a day.  - Referral to Psychiatry  - CBC WITH DIFFERENTIAL; Future  - Comp Metabolic Panel; Future  - TSH WITH REFLEX TO FT4; Future    5. Chronic right hip pain  - Ibuprofen (ADVIL MIGRAINE) 200 MG Cap; Take 200 mg by mouth BID as needed (hip pain PRN).  - Referral to Orthopedics  - Referral to Physical Therapy    6. Closed fracture of distal end of right radius with routine healing, unspecified fracture morphology, subsequent encounter  - Referral to Orthopedics  - Referral to Physical Therapy    7. Acute non-recurrent maxillary sinusitis  - amoxicillin-clavulanate (AUGMENTIN) 875-125 MG Tab; Take 1 Tablet by mouth 2 times a day for 5 days.  Dispense: 10 Tablet; Refill: 0    8. Encounter for well woman exam with routine gynecological exam  - Referral to Gynecology    9. At risk for sleep apnea  - Referral to Pulmonary and Sleep Medicine    10. Screening for cardiovascular condition  - Lipid Profile; Future    11. Need for hepatitis C screening test  - HEP C VIRUS ANTIBODY; Future    12. Screening for HIV (human immunodeficiency virus)  - HIV AG/AB COMBO ASSAY SCREENING; Future    13. Encounter for screening mammogram for breast cancer  - MA-SCREENING MAMMO BILAT W/TOMOSYNTHESIS W/CAD; Future        Follow-up:Return in about 3 weeks (around 1/2/2024), or if symptoms worsen or fail to improve.    This note was created using voice  recognition software. There may be unintended errors in spelling, grammar or content.

## 2024-12-11 NOTE — PATIENT INSTRUCTIONS
UPDATE: For current bulking procedure (differs from pamphlet), this is performed in the OR under light anesthesia, and you go home the same day          Preferred [place] [facility] therapists:     [facility]: ([place], Medicare)  Gabriela Contreras DPT and Jayne Mayorga DPT  [address], [place] [address]  [phone]  [email or web address]  [facility] ([place] only, $100/1h visit)  Eun Spencer DPT  [address]  [place], [place], [address]  [email or web address]  [facility] (all insurances, for Medicaid spec # of visits and 1x/week)   Ivonne Allen  [address]    Phone: [phone]   [email or web address]   Humberto Krishna PT  Kimberley Lui - DPT  [address], [place], [place], [address]  Phone: [phone]  Fax:   [phone]  [facility]: ([place] only)  Anita Vega DPT  [address] [place] [address]  Phone: [phone]  [email or web address]  [facility]   Alicia Cole MPT  [address], [place] [address]  Phone: [phone]  [email or web address]  [facility]  Harleen Romulo MSPT  [address], [place], [place], [address]  Phone: [phone]  [email or web address]

## 2024-12-11 NOTE — PROGRESS NOTES
PT here today for consult   Ref for for urge incontinence unspecified  UTI Sx: none  Last pap: 2021 WNL  Hysterectomy: no  Good #: [phone]   PVR : 20 mL   Pharmacy Verified

## 2025-01-02 DIAGNOSIS — B00.2 RECURRENT ORAL HERPES SIMPLEX: ICD-10-CM

## 2025-01-02 RX ORDER — VALACYCLOVIR HYDROCHLORIDE 500 MG/1
500 TABLET, FILM COATED ORAL 2 TIMES DAILY
Qty: 10 TABLET | Refills: 0 | Status: SHIPPED | OUTPATIENT
Start: 2025-01-02 | End: 2025-01-07

## 2025-01-02 NOTE — TELEPHONE ENCOUNTER
Received request via: Patient    Was the patient seen in the last year in this department? Yes    Does the patient have an active prescription (recently filled or refills available) for medication(s) requested? No    Pharmacy Name: [facility] #[id number]    Does the patient have [facility] Plus and need 100-day supply? (This applies to ALL medications) Patient does not have SCP

## 2025-01-14 DIAGNOSIS — F41.1 GENERALIZED ANXIETY DISORDER: ICD-10-CM

## 2025-01-14 RX ORDER — LORAZEPAM 0.5 MG/1
0.5 TABLET ORAL 2 TIMES DAILY
Qty: 60 TABLET | Refills: 0 | Status: SHIPPED | OUTPATIENT
Start: 2025-01-14 | End: 2025-02-13

## 2025-02-07 ENCOUNTER — APPOINTMENT (OUTPATIENT)
Dept: MEDICAL GROUP | Facility: IMAGING CENTER | Age: 65
End: 2025-02-07
Payer: MEDICARE

## 2025-02-07 VITALS
HEART RATE: 78 BPM | SYSTOLIC BLOOD PRESSURE: 110 MMHG | RESPIRATION RATE: 16 BRPM | DIASTOLIC BLOOD PRESSURE: 68 MMHG | OXYGEN SATURATION: 96 % | BODY MASS INDEX: 37.56 KG/M2 | WEIGHT: 220 LBS | TEMPERATURE: 98.4 F | HEIGHT: 64 IN

## 2025-02-07 DIAGNOSIS — Z13.1 DIABETES MELLITUS SCREENING: ICD-10-CM

## 2025-02-07 DIAGNOSIS — E78.00 PURE HYPERCHOLESTEROLEMIA: ICD-10-CM

## 2025-02-07 DIAGNOSIS — B00.2 RECURRENT ORAL HERPES SIMPLEX: ICD-10-CM

## 2025-02-07 DIAGNOSIS — M25.562 CHRONIC PAIN OF LEFT KNEE: ICD-10-CM

## 2025-02-07 DIAGNOSIS — G89.29 CHRONIC PAIN OF LEFT KNEE: ICD-10-CM

## 2025-02-07 DIAGNOSIS — Z12.31 ENCOUNTER FOR SCREENING MAMMOGRAM FOR BREAST CANCER: ICD-10-CM

## 2025-02-07 PROCEDURE — 3074F SYST BP LT 130 MM HG: CPT | Performed by: INTERNAL MEDICINE

## 2025-02-07 PROCEDURE — 99214 OFFICE O/P EST MOD 30 MIN: CPT | Performed by: INTERNAL MEDICINE

## 2025-02-07 PROCEDURE — 3078F DIAST BP <80 MM HG: CPT | Performed by: INTERNAL MEDICINE

## 2025-02-07 RX ORDER — VALACYCLOVIR HYDROCHLORIDE 500 MG/1
500 TABLET, FILM COATED ORAL 2 TIMES DAILY
Qty: 10 TABLET | Refills: 1 | Status: SHIPPED | OUTPATIENT
Start: 2025-02-07 | End: 2025-02-12

## 2025-02-07 ASSESSMENT — FIBROSIS 4 INDEX: FIB4 SCORE: 1.18

## 2025-02-07 ASSESSMENT — PATIENT HEALTH QUESTIONNAIRE - PHQ9: CLINICAL INTERPRETATION OF PHQ2 SCORE: 0

## 2025-02-07 NOTE — PROGRESS NOTES
Established Patient    Iveth Daley is a 64 y.o. female who presents today with the following:    CC:   Chief Complaint   Patient presents with    Follow-Up     3 month follow up  Lab results 9/21  Left knee requesting xray  Talk about Medication for herpes       HPI:     Verbal consent was acquired by the patient to use [employer] ambient listening note generation during this visit Yes     History of Present Illness  The patient presents for evaluation of left knee pain, herpes, and health maintenance.    General Health Improvement  She reports overall health improvement since her last visit, engaging in regular physical activity, including swimming and [employer] classes at [employer]. She receives chiropractic care at [facility], with 5 sessions so far. She uses Voltaren cream, BenGay Extra Strength, lidocaine patches, ice, heating pads, and Epsom salts for foot relief. She has been losing weight and exercising, improving her sleep quality.    Left Knee Pain  She has experienced left knee pain for 6 months, possibly due to pronation, similar to bone spurs in her shoulder. Tylenol provides relief. She has consulted a physical therapist and uses topical analgesics and a flexible mattress.  - Onset: 6 months ago.  - Location: Left knee.  - Duration: Persistent for 6 months.  - Character: Pain possibly due to pronation, similar to bone spurs in her shoulder.  - Alleviating/Aggravating Factors: Tylenol provides relief; uses topical analgesics and a flexible mattress.  - Severity: Not specified.    Herpes Outbreaks  She experiences oral herpes outbreaks twice a year and requests valacyclovir with one refill.  - Onset: Twice a year.  - Character: Oral herpes outbreaks.  - Treatment: Requests valacyclovir with one refill.    Health Maintenance  She received shingles and COVID-19 vaccines but needs the RSV vaccine. She practices good hand hygiene. She had a mammogram and a urology appointment, reducing  "caffeine and carbonation intake to help with bladder issues.    Supplemental Information  She had an ingrown toenail treated by Dr. No and sees Dr. Acuña. She uses a CPAP machine but dislikes it, trying different masks. Weight loss and exercise have improved her sleep quality.    FAMILY HISTORY  - No family history of breast cancer    MEDICATIONS  - Tylenol  - Voltaren cream  - BenGay Extra Strength  - Lidocaine patches    IMMUNIZATIONS  - Received shingles vaccine  - Received COVID-19 vaccine    No problems updated.     Current Outpatient Medications   Medication Sig    valACYclovir (VALTREX) 500 MG Tab Take 1 Tablet by mouth 2 times a day for 5 days.    LORazepam (ATIVAN) 0.5 MG Tab Take 1 Tablet by mouth 2 times a day for 30 days.    citalopram (CELEXA) 10 MG tablet Take 1 Tablet by mouth every evening for 90 days.    HYDROcodone-acetaminophen (NORCO) 5-325 MG Tab per tablet TAKE 1 TABLET BY MOUTH EVERY 6 HOURS AS NEEDED FOR PAIN M54.16 7 DAYS SUPPLY    famotidine (PEPCID) 20 MG Tab Take 1 Tablet by mouth every evening.    polyethylene glycol/lytes (MIRALAX) Pack Take 17 g by mouth every day.     Allergies   Allergen Reactions    Codeine Shortness of Breath    Sulfa Drugs Rash       Allergies, past medical history, past surgical history, medications, family history, social history reviewed and updated.    ROS Please see HPI    Physical Exam  Vitals: /68 (BP Location: Left arm, Patient Position: Sitting, BP Cuff Size: Large adult)   Pulse 78   Temp 36.9 °C (98.4 °F) (Temporal)   Resp 16   Ht 1.613 m (5' 3.5\")   Wt 99.8 kg (220 lb)   SpO2 96%   BMI 38.36 kg/m²   Physical Exam  Constitutional:       Appearance: Normal appearance.   HENT:      Head: Normocephalic and atraumatic.      Right Ear: External ear normal.      Left Ear: External ear normal.      Nose: Nose normal.      Mouth/Throat:      Pharynx: Oropharynx is clear.   Cardiovascular:      Rate and Rhythm: Normal rate and regular rhythm.    "   Pulses: Normal pulses.   Pulmonary:      Effort: Pulmonary effort is normal.      Breath sounds: Normal breath sounds.   Abdominal:      General: Bowel sounds are normal.      Palpations: Abdomen is soft.      Tenderness: There is no abdominal tenderness.   Musculoskeletal:      Cervical back: Neck supple.      Left knee: Tenderness present over the medial joint line and lateral joint line. No LCL laxity, MCL laxity, ACL laxity or PCL laxity.     Right lower leg: No edema.      Left lower leg: No edema.   Skin:     General: Skin is warm and dry.   Neurological:      Mental Status: She is alert. Mental status is at baseline.   Psychiatric:         Mood and Affect: Mood normal.         Behavior: Behavior normal.         Thought Content: Thought content normal.         Judgment: Judgment normal.             Assessment and Plan    Assessment & Plan      1. Chronic pain of left knee  - Referral to Orthopedics  - DX-KNEE COMPLETE 4+ LEFT; Future  - Continue using ice, heat, and topical analgesics.  - Consider physical therapy depending on X-ray results.    2. Recurrent oral herpes simplex  - valACYclovir (VALTREX) 500 MG Tab; Take 1 Tablet by mouth 2 times a day for 5 days.  Dispense: 10 Tablet; Refill: 1    3. Encounter for screening mammogram for breast cancer  - MA-SCREENING MAMMO BILAT W/TOMOSYNTHESIS W/CAD; Future    Health maintenance.  - Mammogram ordered as it has been over a year since the last screening.  - Advised to obtain the RSV vaccine.        Follow-up:Return in about 4 months (around 6/7/2025), or if symptoms worsen or fail to improve.    This note was created using voice recognition software. There may be unintended errors in spelling, grammar or content.

## 2025-02-21 ENCOUNTER — APPOINTMENT (OUTPATIENT)
Dept: BEHAVIORAL HEALTH | Facility: CLINIC | Age: 65
End: 2025-02-21
Payer: MEDICARE

## 2025-02-28 ENCOUNTER — OFFICE VISIT (OUTPATIENT)
Dept: BEHAVIORAL HEALTH | Facility: CLINIC | Age: 65
End: 2025-02-28
Payer: MEDICARE

## 2025-02-28 DIAGNOSIS — F33.1 MAJOR DEPRESSIVE DISORDER, RECURRENT EPISODE, MODERATE (HCC): ICD-10-CM

## 2025-02-28 DIAGNOSIS — F41.1 GENERALIZED ANXIETY DISORDER: ICD-10-CM

## 2025-02-28 RX ORDER — CITALOPRAM HYDROBROMIDE 10 MG/1
10 TABLET ORAL NIGHTLY
Qty: 90 TABLET | Refills: 0 | Status: SHIPPED | OUTPATIENT
Start: 2025-02-28 | End: 2025-05-29

## 2025-02-28 RX ORDER — LORAZEPAM 0.5 MG/1
0.5 TABLET ORAL 2 TIMES DAILY
Qty: 60 TABLET | Refills: 0 | Status: SHIPPED | OUTPATIENT
Start: 2025-02-28 | End: 2025-03-30

## 2025-02-28 NOTE — PROGRESS NOTES
[facility]   Follow Up Assessment     This provider informed the patient their medical records are totally confidential except for the use by other providers involved in their care, or if the patient signs a release, or to report instances of child or elder abuse, or if it is determined they are an immediate risk to harm themselves or others.    Name: Iveth Daley  MRN: [id number]  : 1960  Age: 64 y.o.  Date of assessment: 2025  PCP: Abhinav Jones M.D.      Subjective:  Chart reviewed prior to seeing her in my office.  She was last seen on .  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  She sold her home in [place] and relocated to the [facility].  She indicates that she has not taken care of herself and at times over spends which is creating financial problems.  She has not had to budget herself for quite some time because she has always been employed.  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  Her sisters daughter Kristina lives in the [facility] and has been trying to help her out.  Her sister's daughter sent a note expressing concerns about her Fauzia not having any longterm savings.  She would like to provide digital information by joining Iveth for the next visit.  This is okay with Iveth  We reviewed her current medication combination, purposes, doses, etc.  Another psychiatrist in [place] prescribed Depakote for her.  She has never been diagnosed as having bipolar disorder.    Objective:  She is alert, oriented, and cooperative.  Relatedness is good.  Grooming is good.  Speech is normal rate.  Anxious.  Memory is good.  Insight and judgment are fairly good.  No indication of psychotic thinking.    Current Risk:       Suicidal: Not suicidal       Homicidal: Not homicidal       Self-Harm: No plan to harm self       Relapse: (Low/Moderate/High): Moderate       Crisis Safety Plan Reviewed: Discussed with  patient    Diagnosis:   Major depressive disorder, recurrent  Generalized anxiety disorder    Treatment Plan:  The current treatment plan consists of a follow-up visit in 1 month designed to evaluate her depression and anxiety.    Duration will be for a minimum of 12 months and will be reviewed at each visit.    Goals: Remission of depression with control of anxiety in order to prevent relapse due to the chronic nature of her behavioral health problems and mental illness.  Continue Celexa 10 mg at bedtime.  Use Ativan 0.5 mg twice daily as needed.        Hugo Cartagena M.D.      This note was created using voice recognition software (Dragon). The accuracy of the dictation is limited by the abilities of the software. I have reviewed the note prior to signing, however some errors in grammar and context are still possible. If you have any questions related to this note please do not hesitate to contact our office.

## 2025-03-28 ENCOUNTER — APPOINTMENT (OUTPATIENT)
Dept: BEHAVIORAL HEALTH | Facility: CLINIC | Age: 65
End: 2025-03-28
Payer: MEDICARE

## 2025-04-09 ENCOUNTER — OFFICE VISIT (OUTPATIENT)
Dept: BEHAVIORAL HEALTH | Facility: CLINIC | Age: 65
End: 2025-04-09
Payer: MEDICARE

## 2025-04-09 DIAGNOSIS — F33.1 MAJOR DEPRESSIVE DISORDER, RECURRENT EPISODE, MODERATE (HCC): ICD-10-CM

## 2025-04-09 DIAGNOSIS — F41.1 GENERALIZED ANXIETY DISORDER: ICD-10-CM

## 2025-04-09 PROCEDURE — 99214 OFFICE O/P EST MOD 30 MIN: CPT | Performed by: PSYCHIATRY & NEUROLOGY

## 2025-04-09 RX ORDER — LORAZEPAM 0.5 MG/1
0.5 TABLET ORAL 2 TIMES DAILY
Qty: 60 TABLET | Refills: 0 | Status: SHIPPED | OUTPATIENT
Start: 2025-04-09 | End: 2025-05-09

## 2025-04-09 RX ORDER — CITALOPRAM HYDROBROMIDE 10 MG/1
10 TABLET ORAL NIGHTLY
Qty: 90 TABLET | Refills: 0 | Status: SHIPPED | OUTPATIENT
Start: 2025-04-09 | End: 2025-07-08

## 2025-04-09 NOTE — PROGRESS NOTES
[facility]   Follow Up Assessment     This provider informed the patient their medical records are totally confidential except for the use by other providers involved in their care, or if the patient signs a release, or to report instances of child or elder abuse, or if it is determined they are an immediate risk to harm themselves or others.    Name: Iveth Daley  MRN: [id number]  : 1960  Age: 64 y.o.  Date of assessment: 2025  PCP: Abhinav Jones M.D.      Subjective:  Chart reviewed prior to seeing her in my office.  She was seen most recently on .  We reviewed her current dictation combination, purposes, doses, etc.  She prefers to not make any changes at this time.  She will consult a chiropractor this afternoon.    Objective:  She is alert, oriented, and cooperative.  Relatedness is good.  Grooming is good.  Good if.  Anxious.  Memory is good.  Insight and judgment are fairly good.  No indication of psychotic thinking.    Current Risk:       Suicidal: Not suicidal       Homicidal: Not homicidal       Self-Harm: No plan to harm self       Relapse: (Low/Moderate/High): Moderate       Crisis Safety Plan Reviewed: Discussed with patient    Diagnosis:   Major depressive disorder, recurrent  Generalized anxiety disorder    Treatment Plan:  The current treatment plan consists of quarterly psychiatric sessions designed to evaluate her depression and anxiety.    Duration will be for a minimum of 12 months and will be reviewed at each visit.    Goals: Remission of depression with control of anxiety in order to prevent relapse due to the chronic nature of her behavioral health problems and mental illness.  Continue Celexa 10 mg at bedtime.  Use Ativan 0.5 mg twice daily as needed.    Hugo Cartagena M.D.      This note was created using voice recognition software (Dragon). The accuracy of the dictation is limited by the abilities of the software. I have reviewed the  note prior to signing, however some errors in grammar and context are still possible. If you have any questions related to this note please do not hesitate to contact our office.

## 2025-05-19 ENCOUNTER — OFFICE VISIT (OUTPATIENT)
Dept: MEDICAL GROUP | Facility: IMAGING CENTER | Age: 65
End: 2025-05-19
Payer: MEDICARE

## 2025-05-19 VITALS
OXYGEN SATURATION: 98 % | HEIGHT: 64 IN | HEART RATE: 82 BPM | TEMPERATURE: 98.6 F | BODY MASS INDEX: 37.73 KG/M2 | RESPIRATION RATE: 16 BRPM | DIASTOLIC BLOOD PRESSURE: 72 MMHG | WEIGHT: 221 LBS | SYSTOLIC BLOOD PRESSURE: 112 MMHG

## 2025-05-19 DIAGNOSIS — R10.32 LEFT LOWER QUADRANT ABDOMINAL PAIN: Primary | ICD-10-CM

## 2025-05-19 DIAGNOSIS — R19.5 LOOSE STOOLS: ICD-10-CM

## 2025-05-19 PROCEDURE — 3078F DIAST BP <80 MM HG: CPT

## 2025-05-19 PROCEDURE — 99214 OFFICE O/P EST MOD 30 MIN: CPT

## 2025-05-19 PROCEDURE — 3074F SYST BP LT 130 MM HG: CPT

## 2025-05-19 PROCEDURE — 1125F AMNT PAIN NOTED PAIN PRSNT: CPT

## 2025-05-19 ASSESSMENT — PAIN SCALES - GENERAL: PAINLEVEL_OUTOF10: 6=MODERATE PAIN

## 2025-05-19 ASSESSMENT — FIBROSIS 4 INDEX: FIB4 SCORE: 1.18

## 2025-05-19 NOTE — PROGRESS NOTES
Subjective:     CC:   Chief Complaint   Patient presents with    Abdominal Pain     Lower abdominal pain since the weekend. Has been on a soft foods diet which has helped the discomfort. Has hist ory of diverticulitis. Would like antibiotics for it       HPI:   Iveth presents today to discuss:    LLQ abdominal pain  Patient reports developing left lower quadrant pain with loose stools over the weekend.  She admits eating too many nuts and dairy over the weekend which likely triggered her symptoms.   She has been on a soft diet resulting in some relief but symptoms have not fully resolved.  She reports having history of diverticulitis status post colon resection many years ago.  She has not had any flareups until recently.  She is scheduled to follow-up with [facility] next month.   She admits taking MiraLAX as she has history of constipation from Norco use.    Denies fevers, fatigue, nausea, vomiting, severe abdominal pain and rigidity, hematochezia, melena.          Past Medical History[1]  Family History   Problem Relation Age of Onset    Stroke Mother     Parkinson's Disease Father     Cancer Father         Parkinson’s disease    Cancer Sister         MM    No Known Problems Brother     Ovarian Cancer Neg Hx     Tubal Cancer Neg Hx     Peritoneal Cancer Neg Hx     Colorectal Cancer Neg Hx     Breast Cancer Neg Hx     Bilateral Breast Cancer Neg Hx     BRCA 1 Neg Hx     BRCA 1 Carrier  Neg Hx     BRCA 2 Neg Hx     BRCA 2 Carrier Neg Hx      Past Surgical History[2]  Social History[3]  Social History     Social History Narrative    Not on file     Current Medications and Prescriptions Ordered in Epic[4]  Codeine and Sulfa drugs    ROS: see hpi  Gen: no fevers/chills  Pulm: no sob, no cough  CV: no chest pain, no palpitations, no edema  GI: no nausea/vomiting, no diarrhea  Skin: no rash    Objective:   Exam:  /72 (BP Location: Right arm, Patient Position: Sitting, BP Cuff Size: Adult)   Pulse 82   Temp 37 °C  "(98.6 °F) (Temporal)   Resp 16   Ht 1.613 m (5' 3.5\")   Wt 100 kg (221 lb)   SpO2 98%   BMI 38.53 kg/m²    Body mass index is 38.53 kg/m².    Gen: Alert and oriented, No apparent distress.  HEENT: Head atraumatic, normocephalic. Pupils equal and round.  Neck: Neck is supple without lymphadenopathy.   Lungs: Normal effort, CTA bilaterally, no wheezes, rhonchi, or rales  CV: Regular rate and rhythm. No murmurs, rubs, or gallops.  ABD: +BS. Non-tender, non-distended. No rebound, rigidity, or guarding.  Ext: No clubbing, cyanosis, edema.    Assessment & Plan:     64 y.o. female with the following -     1. Left lower quadrant abdominal pain (Primary)  2. Loose stools  New stable condition. VSS. No s/s of acute abdomen or hemodynamic compromise. Patient presentation likely diverticulosis vs diverticulitis. Therefore, antibiotics is not indicated at this time.   Will order labs and imaging to evaluate.  Recommend follow-up with GI as scheduled, sooner if able to.    Recommend:  -FODMOP diet. Avoid Gluten, Dairy, Vegetable/seed oils, Seeds, Nuts, and Corn  -Increase gut friendly foods or pre/probiotic supplements.  -No alcohol or smoking  -No NSAIDs  - increase fiber intake. May use Benefiber or Metamucil.  Discussed strict ED precautions: Fever, intractable nausea/vomiting, severe abdominal pain, bloody diarrhea, or any worsening of symptoms.     - CT-ABDOMEN-PELVIS W/O; Future  - CBC WITHOUT DIFFERENTIAL; Future  - Comp Metabolic Panel; Future  - CALPROTECTIN,FECAL; Future    Return if symptoms worsen or fail to improve.    KEYLA Zhou   [facility]    Medical Decision Making/Course:  In the course of preparing for this visit with review of the pertinent past medical history, recent and past clinic visits, current medications, and performing chart, immunization, medical history and medication reconciliation, and in the further course of obtaining the current history pertinent to the clinic " visit today, performing an exam and evaluation, ordering and independently evaluating labs, tests, and/or procedures, prescribing any recommended new medications as noted above, providing any pertinent counseling and education and recommending further coordination of care. This was discussed with patient in a shared-decision making conversation, and they understand and agreed with plan of care.     Please note that this dictation was created using voice recognition software. I have made every reasonable attempt to correct obvious errors, but I expect that there are errors of grammar and possibly content that I did not discover before finalizing the note.         [1]   Past Medical History:  Diagnosis Date    Anxiety     Arthritis     Back pain     Back pain     Bronchitis     Constipation     Depression     Diverticulitis of ascending colon     abscess in the colon    Fatigue     GERD (gastroesophageal reflux disease)     Hearing difficulty     Obesity     Painful joint     Pneumonia     Spinal disorder     Weakness     Wears glasses     Wrist fracture 05/10/2023    Right wrist broken from a fall   [2]   Past Surgical History:  Procedure Laterality Date    SHOULDER SURGERY Right 2015    CARPAL TUNNEL RELEASE      CHOLECYSTECTOMY          COLON RESECTION      HAND SURGERY  2006    Carpel tunnel and flexer trigger release right hand    ORIF, WRIST  May 2023    No surgery broke right wrist   [3]   Social History  Tobacco Use    Smoking status: Former     Current packs/day: 0.00     Types: Cigarettes     Start date: 1979     Quit date: 1981     Years since quittin.9     Passive exposure: Yes    Smokeless tobacco: Never    Tobacco comments:     Smoked in my 20’s socially with meals or cocktails.   Vaping Use    Vaping status: Some Days    Substances: THC   Substance Use Topics    Alcohol use: Yes     Alcohol/week: 0.6 oz     Types: 1 Glasses of wine per week     Comment: weekends    Drug use: Not  Currently     Types: Marijuana   [4]   Current Outpatient Medications Ordered in Epic   Medication Sig Dispense Refill    citalopram (CELEXA) 10 MG tablet Take 1 Tablet by mouth every evening for 90 days. 90 Tablet 0    HYDROcodone-acetaminophen (NORCO) 5-325 MG Tab per tablet TAKE 1 TABLET BY MOUTH EVERY 6 HOURS AS NEEDED FOR PAIN M54.16 7 DAYS SUPPLY      famotidine (PEPCID) 20 MG Tab Take 1 Tablet by mouth every evening. 90 Tablet 3    polyethylene glycol/lytes (MIRALAX) Pack Take 17 g by mouth every day.       No current [facility]-ordered facility-administered medications on file.

## 2025-05-20 ENCOUNTER — HOSPITAL ENCOUNTER (OUTPATIENT)
Dept: LAB | Facility: MEDICAL CENTER | Age: 65
End: 2025-05-20
Attending: INTERNAL MEDICINE
Payer: MEDICARE

## 2025-05-20 ENCOUNTER — RESULTS FOLLOW-UP (OUTPATIENT)
Dept: MEDICAL GROUP | Facility: IMAGING CENTER | Age: 65
End: 2025-05-20
Payer: MEDICARE

## 2025-05-20 ENCOUNTER — HOSPITAL ENCOUNTER (OUTPATIENT)
Facility: MEDICAL CENTER | Age: 65
End: 2025-05-20
Payer: MEDICARE

## 2025-05-20 ENCOUNTER — HOSPITAL ENCOUNTER (OUTPATIENT)
Dept: LAB | Facility: MEDICAL CENTER | Age: 65
End: 2025-05-20
Payer: MEDICARE

## 2025-05-20 DIAGNOSIS — B00.2 RECURRENT ORAL HERPES SIMPLEX: ICD-10-CM

## 2025-05-20 DIAGNOSIS — R10.32 LEFT LOWER QUADRANT ABDOMINAL PAIN: ICD-10-CM

## 2025-05-20 DIAGNOSIS — R19.5 LOOSE STOOLS: ICD-10-CM

## 2025-05-20 DIAGNOSIS — Z13.1 DIABETES MELLITUS SCREENING: ICD-10-CM

## 2025-05-20 DIAGNOSIS — E78.00 PURE HYPERCHOLESTEROLEMIA: ICD-10-CM

## 2025-05-20 LAB
ALBUMIN SERPL BCP-MCNC: 3.9 G/DL (ref 3.2–4.9)
ALBUMIN SERPL BCP-MCNC: 3.9 G/DL (ref 3.2–4.9)
ALBUMIN/GLOB SERPL: 1.3 G/DL
ALBUMIN/GLOB SERPL: 1.3 G/DL
ALP SERPL-CCNC: 74 U/L (ref 30–99)
ALP SERPL-CCNC: 75 U/L (ref 30–99)
ALT SERPL-CCNC: 9 U/L (ref 2–50)
ALT SERPL-CCNC: 9 U/L (ref 2–50)
ANION GAP SERPL CALC-SCNC: 10 MMOL/L (ref 7–16)
ANION GAP SERPL CALC-SCNC: 10 MMOL/L (ref 7–16)
AST SERPL-CCNC: 20 U/L (ref 12–45)
AST SERPL-CCNC: 20 U/L (ref 12–45)
BASOPHILS # BLD AUTO: 0.6 % (ref 0–1.8)
BASOPHILS # BLD: 0.04 K/UL (ref 0–0.12)
BILIRUB SERPL-MCNC: 0.4 MG/DL (ref 0.1–1.5)
BILIRUB SERPL-MCNC: 0.4 MG/DL (ref 0.1–1.5)
BUN SERPL-MCNC: 11 MG/DL (ref 8–22)
BUN SERPL-MCNC: 11 MG/DL (ref 8–22)
CALCIUM ALBUM COR SERPL-MCNC: 10.1 MG/DL (ref 8.5–10.5)
CALCIUM ALBUM COR SERPL-MCNC: 10.2 MG/DL (ref 8.5–10.5)
CALCIUM SERPL-MCNC: 10 MG/DL (ref 8.5–10.5)
CALCIUM SERPL-MCNC: 10.1 MG/DL (ref 8.5–10.5)
CHLORIDE SERPL-SCNC: 102 MMOL/L (ref 96–112)
CHLORIDE SERPL-SCNC: 103 MMOL/L (ref 96–112)
CHOLEST SERPL-MCNC: 183 MG/DL (ref 100–199)
CO2 SERPL-SCNC: 25 MMOL/L (ref 20–33)
CO2 SERPL-SCNC: 25 MMOL/L (ref 20–33)
CREAT SERPL-MCNC: 0.75 MG/DL (ref 0.5–1.4)
CREAT SERPL-MCNC: 0.77 MG/DL (ref 0.5–1.4)
EOSINOPHIL # BLD AUTO: 0.08 K/UL (ref 0–0.51)
EOSINOPHIL NFR BLD: 1.2 % (ref 0–6.9)
ERYTHROCYTE [DISTWIDTH] IN BLOOD BY AUTOMATED COUNT: 46.1 FL (ref 35.9–50)
ERYTHROCYTE [DISTWIDTH] IN BLOOD BY AUTOMATED COUNT: 46.3 FL (ref 35.9–50)
GFR SERPLBLD CREATININE-BSD FMLA CKD-EPI: 85 ML/MIN/1.73 M 2
GFR SERPLBLD CREATININE-BSD FMLA CKD-EPI: 88 ML/MIN/1.73 M 2
GLOBULIN SER CALC-MCNC: 3.1 G/DL (ref 1.9–3.5)
GLOBULIN SER CALC-MCNC: 3.1 G/DL (ref 1.9–3.5)
GLUCOSE SERPL-MCNC: 103 MG/DL (ref 65–99)
GLUCOSE SERPL-MCNC: 105 MG/DL (ref 65–99)
HCT VFR BLD AUTO: 49.9 % (ref 37–47)
HCT VFR BLD AUTO: 50.4 % (ref 37–47)
HDLC SERPL-MCNC: 45 MG/DL
HGB BLD-MCNC: 16.5 G/DL (ref 12–16)
HGB BLD-MCNC: 16.8 G/DL (ref 12–16)
IMM GRANULOCYTES # BLD AUTO: 0.01 K/UL (ref 0–0.11)
IMM GRANULOCYTES NFR BLD AUTO: 0.2 % (ref 0–0.9)
LDLC SERPL CALC-MCNC: 112 MG/DL
LYMPHOCYTES # BLD AUTO: 1.97 K/UL (ref 1–4.8)
LYMPHOCYTES NFR BLD: 30.6 % (ref 22–41)
MCH RBC QN AUTO: 30.4 PG (ref 27–33)
MCH RBC QN AUTO: 31.3 PG (ref 27–33)
MCHC RBC AUTO-ENTMCNC: 32.7 G/DL (ref 32.2–35.5)
MCHC RBC AUTO-ENTMCNC: 33.7 G/DL (ref 32.2–35.5)
MCV RBC AUTO: 92.9 FL (ref 81.4–97.8)
MCV RBC AUTO: 93 FL (ref 81.4–97.8)
MONOCYTES # BLD AUTO: 0.58 K/UL (ref 0–0.85)
MONOCYTES NFR BLD AUTO: 9 % (ref 0–13.4)
NEUTROPHILS # BLD AUTO: 3.76 K/UL (ref 1.82–7.42)
NEUTROPHILS NFR BLD: 58.4 % (ref 44–72)
NRBC # BLD AUTO: 0 K/UL
NRBC BLD-RTO: 0 /100 WBC (ref 0–0.2)
PLATELET # BLD AUTO: 299 K/UL (ref 164–446)
PLATELET # BLD AUTO: 301 K/UL (ref 164–446)
PMV BLD AUTO: 9.1 FL (ref 9–12.9)
PMV BLD AUTO: 9.3 FL (ref 9–12.9)
POTASSIUM SERPL-SCNC: 4.7 MMOL/L (ref 3.6–5.5)
POTASSIUM SERPL-SCNC: 5.2 MMOL/L (ref 3.6–5.5)
PROT SERPL-MCNC: 7 G/DL (ref 6–8.2)
PROT SERPL-MCNC: 7 G/DL (ref 6–8.2)
RBC # BLD AUTO: 5.37 M/UL (ref 4.2–5.4)
RBC # BLD AUTO: 5.42 M/UL (ref 4.2–5.4)
SODIUM SERPL-SCNC: 137 MMOL/L (ref 135–145)
SODIUM SERPL-SCNC: 138 MMOL/L (ref 135–145)
TRIGL SERPL-MCNC: 132 MG/DL (ref 0–149)
TSH SERPL DL<=0.005 MIU/L-ACNC: 0.71 UIU/ML (ref 0.38–5.33)
WBC # BLD AUTO: 6.4 K/UL (ref 4.8–10.8)
WBC # BLD AUTO: 6.6 K/UL (ref 4.8–10.8)

## 2025-05-20 PROCEDURE — 80053 COMPREHEN METABOLIC PANEL: CPT

## 2025-05-20 PROCEDURE — 84443 ASSAY THYROID STIM HORMONE: CPT

## 2025-05-20 PROCEDURE — 80061 LIPID PANEL: CPT

## 2025-05-20 PROCEDURE — 80053 COMPREHEN METABOLIC PANEL: CPT | Mod: 91

## 2025-05-20 PROCEDURE — 85027 COMPLETE CBC AUTOMATED: CPT

## 2025-05-20 PROCEDURE — 85025 COMPLETE CBC W/AUTO DIFF WBC: CPT

## 2025-05-20 PROCEDURE — 83993 ASSAY FOR CALPROTECTIN FECAL: CPT

## 2025-05-20 PROCEDURE — 36415 COLL VENOUS BLD VENIPUNCTURE: CPT

## 2025-05-21 ENCOUNTER — TELEPHONE (OUTPATIENT)
Dept: MEDICAL GROUP | Facility: IMAGING CENTER | Age: 65
End: 2025-05-21
Payer: MEDICARE

## 2025-05-21 ENCOUNTER — HOSPITAL ENCOUNTER (OUTPATIENT)
Dept: RADIOLOGY | Facility: MEDICAL CENTER | Age: 65
End: 2025-05-21
Payer: MEDICARE

## 2025-05-21 DIAGNOSIS — R19.5 LOOSE STOOLS: ICD-10-CM

## 2025-05-21 DIAGNOSIS — R10.32 LEFT LOWER QUADRANT ABDOMINAL PAIN: ICD-10-CM

## 2025-05-21 PROCEDURE — 74176 CT ABD & PELVIS W/O CONTRAST: CPT

## 2025-05-21 NOTE — TELEPHONE ENCOUNTER
CT abdomen results discussed. Overall, her symptoms are improving. She will f/u with [facility] and PCP as scheduled.

## 2025-05-23 LAB — CALPROTECTIN STL-MCNT: 27 UG/G

## 2025-06-13 ENCOUNTER — APPOINTMENT (OUTPATIENT)
Dept: MEDICAL GROUP | Facility: IMAGING CENTER | Age: 65
End: 2025-06-13
Payer: MEDICARE

## 2025-07-09 ENCOUNTER — OFFICE VISIT (OUTPATIENT)
Dept: BEHAVIORAL HEALTH | Facility: CLINIC | Age: 65
End: 2025-07-09
Payer: MEDICARE

## 2025-07-09 DIAGNOSIS — F33.1 MAJOR DEPRESSIVE DISORDER, RECURRENT EPISODE, MODERATE (HCC): Primary | ICD-10-CM

## 2025-07-09 DIAGNOSIS — K21.9 GASTROESOPHAGEAL REFLUX DISEASE WITHOUT ESOPHAGITIS: ICD-10-CM

## 2025-07-09 DIAGNOSIS — F41.1 GENERALIZED ANXIETY DISORDER: ICD-10-CM

## 2025-07-09 PROCEDURE — 99214 OFFICE O/P EST MOD 30 MIN: CPT | Performed by: PSYCHIATRY & NEUROLOGY

## 2025-07-09 RX ORDER — CITALOPRAM HYDROBROMIDE 10 MG/1
10 TABLET ORAL NIGHTLY
Qty: 90 TABLET | Refills: 0 | Status: SHIPPED | OUTPATIENT
Start: 2025-07-09 | End: 2025-10-07

## 2025-07-09 RX ORDER — LORAZEPAM 0.5 MG/1
0.5 TABLET ORAL 2 TIMES DAILY
Qty: 60 TABLET | Refills: 0 | Status: SHIPPED | OUTPATIENT
Start: 2025-07-09 | End: 2025-08-08

## 2025-07-09 RX ORDER — FAMOTIDINE 20 MG/1
20 TABLET, FILM COATED ORAL EVERY EVENING
Qty: 90 TABLET | Refills: 3 | Status: SHIPPED | OUTPATIENT
Start: 2025-07-09

## 2025-07-09 NOTE — PROGRESS NOTES
[facility]   Follow Up Assessment     This provider informed the patient their medical records are totally confidential except for the use by other providers involved in their care, or if the patient signs a release, or to report instances of child or elder abuse, or if it is determined they are an immediate risk to harm themselves or others.    Name: Iveth Daley  MRN: [id number]  : 1960  Age: 64 y.o.  Date of assessment: 2025  PCP: Abhinav Jones M.D.      Subjective:  Chart reviewed prior to seeing her in my office.  She was last seen on .  We reviewed her current medications, purposes, doses, etc.  He prefers to continue Celexa 10 mg at bedtime and uses Ativan .5mg carefully.  No medication changes requested.  She enjoys swimming at a local pool.  Chiropractic treatment has been helpful for her pain problems.    Objective:  She is alert, oriented, and cooperative.  Relatedness is good.  Grooming is good.  Speech is normal rate.  Less anxious.  Memory is good.  Insight and judgment are fairly good.  No indication of psychotic thinking.    Current Risk:       Suicidal: Not suicidal       Homicidal: Not homicidal       Self-Harm: No plan to harm self       Relapse: (Low/Moderate/High): Moderate       Crisis Safety Plan Reviewed:    Diagnosis:   Major depressive disorder, recurrent  Generalized anxiety disorder    Treatment Plan:  The current treatment plan consists of quarterly psychiatric sessions designed to evaluate her depression and anxiety.    Duration will be for a minimum of 12 months and will be reviewed at each visit.    Goals: Remission of depression with control of anxiety in order to prevent relapse due to the chronic nature of her behavioral health problems and mental illness.  Continue Celexa 10 mg at bedtime.  Use the Ativan 0.5 mg twice daily as needed.    Hugo Cartagena M.D.      This note was created using voice recognition software (Dragon). The  accuracy of the dictation is limited by the abilities of the software. I have reviewed the note prior to signing, however some errors in grammar and context are still possible. If you have any questions related to this note please do not hesitate to contact our office.

## 2025-07-15 ENCOUNTER — APPOINTMENT (OUTPATIENT)
Dept: MEDICAL GROUP | Facility: IMAGING CENTER | Age: 65
End: 2025-07-15
Payer: MEDICARE

## 2025-08-26 ENCOUNTER — APPOINTMENT (OUTPATIENT)
Dept: RADIOLOGY | Facility: IMAGING CENTER | Age: 65
End: 2025-08-26
Payer: MEDICARE

## 2025-08-26 ENCOUNTER — OFFICE VISIT (OUTPATIENT)
Dept: URGENT CARE | Facility: CLINIC | Age: 65
End: 2025-08-26
Payer: MEDICARE

## 2025-08-26 VITALS
RESPIRATION RATE: 14 BRPM | HEIGHT: 63 IN | OXYGEN SATURATION: 95 % | SYSTOLIC BLOOD PRESSURE: 122 MMHG | HEART RATE: 78 BPM | WEIGHT: 220 LBS | DIASTOLIC BLOOD PRESSURE: 80 MMHG | BODY MASS INDEX: 38.98 KG/M2 | TEMPERATURE: 97.4 F

## 2025-08-26 DIAGNOSIS — S99.922A INJURY OF LEFT FOOT, INITIAL ENCOUNTER: ICD-10-CM

## 2025-08-26 DIAGNOSIS — S99.922A INJURY OF LEFT FOOT, INITIAL ENCOUNTER: Primary | ICD-10-CM

## 2025-08-26 DIAGNOSIS — S92.515A CLOSED NONDISPLACED FRACTURE OF PROXIMAL PHALANX OF LESSER TOE OF LEFT FOOT, INITIAL ENCOUNTER: ICD-10-CM

## 2025-08-26 PROCEDURE — 3079F DIAST BP 80-89 MM HG: CPT

## 2025-08-26 PROCEDURE — 3074F SYST BP LT 130 MM HG: CPT

## 2025-08-26 PROCEDURE — 99214 OFFICE O/P EST MOD 30 MIN: CPT

## 2025-08-26 PROCEDURE — 73630 X-RAY EXAM OF FOOT: CPT | Mod: TC,LT | Performed by: RADIOLOGY

## 2025-08-26 ASSESSMENT — ENCOUNTER SYMPTOMS
STRIDOR: 0
WEAKNESS: 0
TINGLING: 0
FALLS: 0
FOCAL WEAKNESS: 0
VOMITING: 0
SHORTNESS OF BREATH: 0
SENSORY CHANGE: 0
FEVER: 0
NAUSEA: 0

## 2025-08-26 ASSESSMENT — FIBROSIS 4 INDEX: FIB4 SCORE: 1.46

## 2025-08-26 ASSESSMENT — VISUAL ACUITY: OU: 1
